# Patient Record
Sex: FEMALE | Race: BLACK OR AFRICAN AMERICAN | NOT HISPANIC OR LATINO | URBAN - METROPOLITAN AREA
[De-identification: names, ages, dates, MRNs, and addresses within clinical notes are randomized per-mention and may not be internally consistent; named-entity substitution may affect disease eponyms.]

---

## 2023-06-21 ENCOUNTER — APPOINTMENT (OUTPATIENT)
Dept: LAB | Facility: LAB | Age: 23
End: 2023-06-21
Payer: MEDICAID

## 2023-06-21 LAB
ABO GROUP (TYPE) IN BLOOD: NORMAL
ANTIBODY SCREEN: NORMAL
HIV 1/ 2 AG/AB SCREEN: NONREACTIVE
RH FACTOR: NORMAL
RUBELLA VIRUS IGG AB: POSITIVE
SYPHILIS TOTAL AB: NONREACTIVE
VARICELLA ZOSTER IGG: POSITIVE

## 2023-06-22 LAB
CHLAMYDIA TRACH., AMPLIFIED: NEGATIVE
HEPATITIS B VIRUS SURFACE AG PRESENCE IN SERUM: NONREACTIVE
HEPATITIS C VIRUS AB PRESENCE IN SERUM: NONREACTIVE
N. GONORRHEA, AMPLIFIED: NEGATIVE
URINE CULTURE: NORMAL

## 2023-06-23 LAB
HEMOGLOBIN A2: 3.1 %
HEMOGLOBIN A: 96.5 %
HEMOGLOBIN F: 0.4 %
HEMOGLOBIN IDENTIFICATION INTERPRETATION: NORMAL
PATH REVIEW-HGB IDENTIFICATION: NORMAL

## 2023-08-14 ENCOUNTER — HOSPITAL ENCOUNTER (OUTPATIENT)
Dept: DATA CONVERSION | Facility: HOSPITAL | Age: 23
End: 2023-08-14
Attending: OBSTETRICS & GYNECOLOGY
Payer: MEDICAID

## 2023-08-14 DIAGNOSIS — Z3A.25 25 WEEKS GESTATION OF PREGNANCY (HHS-HCC): ICD-10-CM

## 2023-08-14 DIAGNOSIS — O36.8120 DECREASED FETAL MOVEMENTS, SECOND TRIMESTER, NOT APPLICABLE OR UNSPECIFIED (HHS-HCC): ICD-10-CM

## 2023-08-14 DIAGNOSIS — N89.8 OTHER SPECIFIED NONINFLAMMATORY DISORDERS OF VAGINA: ICD-10-CM

## 2023-08-14 DIAGNOSIS — O09.32 SUPERVISION OF PREGNANCY WITH INSUFFICIENT ANTENATAL CARE, SECOND TRIMESTER (HHS-HCC): ICD-10-CM

## 2023-08-14 DIAGNOSIS — Z34.80 ENCOUNTER FOR SUPERVISION OF OTHER NORMAL PREGNANCY, UNSPECIFIED TRIMESTER (HHS-HCC): ICD-10-CM

## 2023-08-14 DIAGNOSIS — Z20.2 CONTACT WITH AND (SUSPECTED) EXPOSURE TO INFECTIONS WITH A PREDOMINANTLY SEXUAL MODE OF TRANSMISSION: ICD-10-CM

## 2023-08-14 DIAGNOSIS — R10.12 LEFT UPPER QUADRANT PAIN: ICD-10-CM

## 2023-08-14 DIAGNOSIS — O26.892 OTHER SPECIFIED PREGNANCY RELATED CONDITIONS, SECOND TRIMESTER (HHS-HCC): ICD-10-CM

## 2023-08-14 DIAGNOSIS — R35.0 FREQUENCY OF MICTURITION: ICD-10-CM

## 2023-08-14 LAB
CLUE CELLS WET PREP: NORMAL
TRICH WET PREP: NORMAL
TRICHOMONAS REFLEX COMMENT: NORMAL
WBC WET PREP: NORMAL /HPF
YEAST PRESENCE WET PREP: NORMAL

## 2023-08-15 LAB
CHLAMYDIA TRACH., AMPLIFIED: NEGATIVE
N. GONORRHEA, AMPLIFIED: NEGATIVE
TRICHOMONAS VAGINALIS: NEGATIVE
URINE CULTURE: NO GROWTH

## 2023-08-25 ENCOUNTER — LAB (OUTPATIENT)
Dept: LAB | Facility: LAB | Age: 23
End: 2023-08-25
Payer: MEDICAID

## 2023-08-25 LAB
ERYTHROCYTE DISTRIBUTION WIDTH (RATIO) BY AUTOMATED COUNT: 12.9 % (ref 11.5–14.5)
ERYTHROCYTE MEAN CORPUSCULAR HEMOGLOBIN CONCENTRATION (G/DL) BY AUTOMATED: 33.8 G/DL (ref 32–36)
ERYTHROCYTE MEAN CORPUSCULAR VOLUME (FL) BY AUTOMATED COUNT: 88 FL (ref 80–100)
ERYTHROCYTES (10*6/UL) IN BLOOD BY AUTOMATED COUNT: 4.31 X10E12/L (ref 4–5.2)
GLUCOSE, 1 HR SCREEN, PREG: 83 MG/DL
HEMATOCRIT (%) IN BLOOD BY AUTOMATED COUNT: 37.9 % (ref 36–46)
HEMOGLOBIN (G/DL) IN BLOOD: 12.8 G/DL (ref 12–16)
LEUKOCYTES (10*3/UL) IN BLOOD BY AUTOMATED COUNT: 6.2 X10E9/L (ref 4.4–11.3)
NRBC (PER 100 WBCS) BY AUTOMATED COUNT: 0 /100 WBC (ref 0–0)
PLATELETS (10*3/UL) IN BLOOD AUTOMATED COUNT: 273 X10E9/L (ref 150–450)
REFLEX ADDED, ANEMIA PANEL: NORMAL
SYPHILIS TOTAL AB: NONREACTIVE

## 2023-08-25 ASSESSMENT — EDINBURGH POSTNATAL DEPRESSION SCALE (EPDS)
I HAVE BEEN ABLE TO LAUGH AND SEE THE FUNNY SIDE OF THINGS: AS MUCH AS I ALWAYS COULD
I HAVE LOOKED FORWARD WITH ENJOYMENT TO THINGS: RATHER LESS THAN I USED TO
I HAVE BEEN ANXIOUS OR WORRIED FOR NO GOOD REASON: NO, NOT AT ALL
THINGS HAVE BEEN GETTING ON TOP OF ME: NO, I HAVE BEEN COPING AS WELL AS EVER
THE THOUGHT OF HARMING MYSELF HAS OCCURRED TO ME: NEVER
I HAVE FELT SAD OR MISERABLE: NO, NOT AT ALL
I HAVE FELT SCARED OR PANICKY FOR NO GOOD REASON: NO, NOT AT ALL
I HAVE BLAMED MYSELF UNNECESSARILY WHEN THINGS WENT WRONG: NO, NEVER
I HAVE BEEN SO UNHAPPY THAT I HAVE HAD DIFFICULTY SLEEPING: NOT AT ALL
I HAVE BEEN SO UNHAPPY THAT I HAVE BEEN CRYING: NO, NEVER
TOTAL SCORE: 1

## 2023-09-29 VITALS
WEIGHT: 166.45 LBS | SYSTOLIC BLOOD PRESSURE: 103 MMHG | DIASTOLIC BLOOD PRESSURE: 70 MMHG | RESPIRATION RATE: 20 BRPM | BODY MASS INDEX: 27.73 KG/M2 | HEIGHT: 65 IN | TEMPERATURE: 98.2 F | HEART RATE: 78 BPM | OXYGEN SATURATION: 100 %

## 2023-09-30 NOTE — PROGRESS NOTES
Current Stage:   Stage: Triage     OB Dating:   EDC/EGA:  ·  Final DIANA 2023   ·  EGA 25.1     Subjective Data:   Antepartum:  Vaginal Bleeding: Yes   Contractions/Abdominal Pain: Yes   Discharge/Loss of Fluid: No   Fetal Movement: Decreased   Fevers/Chills: No   Preeclampsia Symptoms: No   Antepartum:    24 yo  at 25.1 wga by LMP c/w 21.2 wk US, presents with decreased FM and abdominal pain.    Pt reports that last Tuesday, she walked a total of 7 hours. At the end of the day, she began having cramping and one episode of dark vaginal spotting that soaked through her underwear. The cramping and bleeding resolved that night, however, she has not  felt the baby move since then. She denies any fall or trauma to her abdomen. She also reports vaginal itching and desires STI testing today. She reports the upper left abdominal pain happens every time she eats so she has not been eating as much as a  result. She denies F/C/N/V/constipation. Last BM was this am. Additionally, she reports increased urination and thinks she may have a UTI.    Pregnancy notable for:  - One prenatal visit at 17 wks    OBHx:  2020: 40 wks, , 7#9oz  : SAB  : 38.6 wks, , 7#13oz    GYN hx:  denies h/o abnormal Pap or STIs  PMH: denies  PSH: one wisdom tooth removed  Fam hx: non-contributory  Meds: PNV  All: NKDA  Social hx: denies t/e/d          Objective Information:    Objective Information:      T   P  R  BP   MAP  SpO2   Value  36.1  76  18  102/60   74  100%  Date/Time  16:07  16:37  16:07  16:07   16:07  16:37  Range  (36.1C - 36.8C )  (73 - 78 )  (18 - 20 )  (102 - 103 )/ (60 - 70 )  (74 - 74 )  (99% - 100% )      Pain reported at  16:07: 0 = None      Physical Exam:   Constitutional: alert, oriented x3, conversational   Obstetric: 140, mod variability, +accels, -decels,  appropriate for gestational age  [place]: No contractions  SSE: mod milky white discharge, ectropion noted at  7oclock, no active bleeding from cervix, no blood in vault  SVE: externally 1 cm, internally closed/long/high   Eyes: Sclera white, EOM intact, no discharge or erythema   ENMT: No hearing deficit, no goiter present   Head/Neck: Normocephalic, atraumatic, full range  of motion intact   Respiratory/Thorax: normal respiratory effort   Gastrointestinal: soft, gravid, non-tender in all  quads   Genitourinary: No flank pain bilaterally   Musculoskeletal: Grossly WNL   Extremities: No edema, discoloration, or pain in  BLE   Neurological: Speech clear, no deficits noted   Psychological: Appropriate mood, behavior. Calm,  cooperative.   Skin: no rashes or lesions     Recent Lab Results:    Results:        I have reviewed these laboratory results:    Wet Prep. w/Reflex to Trich. by Amp Det.  14-Aug-2023 16:50:00      Result Value    Trichomonas  NONE SEEN    Clue Cell Identification  NONE SEEN    Yeast Cells  NONE SEEN    WBC, Wet Prep  1-2    Comments_  SEE COMMENT TRICHOMONAS WAS NOT SEEN BY WET PREP  REFLEXED TO TRICHOMONAS VAGINALIS BY  AMPLIFIED DETECTION.    Fluid Source  Genital vaginal          Testing:   NST Interpretation - Baby A:  ·  Baseline    ·  Variability moderate (amplitude range 6 to 25 bpm)   ·  Interpretation Appropriate for EGA (2 10x10 accels)   ·  Accelerations +   ·  Decelerations -     Assessment and Plan:        Additional Dx:   Limited prenatal care in second trimester: Entered Date:  14-Aug-2023 17:50   Vaginal itching: Entered Date: 14-Aug-2023 17:50   Abdominal pain during pregnancy in second trimester: Entered  Date: 14-Aug-2023 17:49   Decreased fetal movements in second trimester, single or unspecified fetus : Entered Date: 14-Aug-2023 17:49    Assessment:    22 yo  at 25.1 wga by LMP c/w 21.2 wk US, presents with decreased FM and abdominal pain.    Decreased FM  - no FM since last week  - FHR 140s, appropriate for gestational age    Abdominal Pain/VB  - no pain or  bleeding currently  - SSE: mod milky white discharge, ectropion noted at 7o'clock, no active bleeding from cervix, no blood in vault  - SVE: externally 1 cm, internally closed/long/high  - GC/CT/wet prep, urine cx sent, will notify if abnormal  - Rh positive, Rhogam not indicated  - no c/f PTL/abruption based on physical exam  - start Pepcid daily for suspected GERD based on description of meal-related discomfort in LUQ    Limited Prenatal Care  - one visit this pregnancy at 17 wks  - RN at [facility] 1200 tasked to set up an appt, now scheduled     Dispo: Home with precautions    Plan and tracing discussed with Dr. Tobar who reviewed tracing and agrees with d/c home    Elida Lucia, MSN, APRN, [facility]-BC      Plan of Care Reviewed With:  Plan of Care Reviewed With: patient     Attestation:   Note Completion:  I am a:  Advanced Practice Provider   Attending Only - Shared Visit with Advanced Practice Provider This is a shared visit.  I have reviewed the Advanced Practice Provider?s encounter note, approve the Advanced Practice Provider?s documentation,  and provide the following additional information from my personal encounter.    Comments/ Additional Findings    Wet prep wnl. Bleeding likely secondary to ectropion. Counseled patient on importance of establishing regular prenatal care.    Scarlet Tobar MD           Electronic Signatures:  Elida Lucia (APRN-CNP)  (Signed 14-Aug-2023 17:51)   Authored: Current Stage, OB Dating, Subjective Data,  Objective Data,  Testing, Assessment and Plan, Note Completion  Scarlet Tobar (MD (Fellow))  (Signed 14-Aug-2023 18:49)   Authored: Subjective Data, Objective Data, Assessment  and Plan, Note Completion   Co-Signer: Assessment and Plan, Note Completion      Last Updated: 14-Aug-2023 18:49 by Scarlet Tobar (MD (Fellow))

## 2023-11-07 PROBLEM — Z34.80 SUPERVISION OF OTHER NORMAL PREGNANCY, ANTEPARTUM (HHS-HCC): Status: ACTIVE | Noted: 2023-11-07

## 2023-11-07 NOTE — PROGRESS NOTES
Jareth Kamara is a 23 y.o.  at 37w5d with a working estimated date of delivery of 2023, by Last Menstrual Period who presents for a routine prenatal visit. Pt notes she not been seen since 26w because she was told that someone would give her a call to schedule a new appointment.     Pt notes  7/10 left upper quadrant pain when she does housework, states that it only occurs during that time and is sharp, lasting for about 30 seconds. She says that it feels like gas, denies rigid abdomen and vaginal bleeding. Notes that it does not feel like contractions.     Pt notes that she feels supported at home and has access to food. She states that really she's not eating a lot, just mainly drinking green tea.     She denies vaginal bleeding, leakage of fluid, decreased fetal movements, or contractions.    Objective   Physical Exam:   Weight: 75.8 kg (167 lb)  Expected Total Weight Gain: 7 kg (15 lb)-11.5 kg (25 lb)   Pregravid BMI: 27.46  BP: 118/73  Fetal Heart Rate: 154 Fundal Height (cm): 37 cm               Problem List Items Addressed This Visit       Supervision of other normal pregnancy, antepartum    Overview       male   added to BirthTracks   breast and bottlefeeding    Birth control: pt notes that she would like 6 week PP IUD (and plans to hide that from her partner and family members)          Poor weight gain of pregnancy    Overview       2lb weight gain so far during pregnancy  Fundal height measured wnl   Patient denies food insecurity ; she just isn't eating  Strongly encouraged patient to eat more, high protein foods          Limited prenatal care in third trimester    Overview     Inquired about pt's lapse in prenatal care, has not been since 26.5 wga  Pt notes she has transportation but previously was told someone would call her to schedule a subsequent appointment and did not receive a call. Informed pt that she will now be seen weekly, pt endorses understanding           RESOLVED: 37 weeks gestation of pregnancy - Primary    Relevant Orders    Group B Streptococcus (GBS) Prenatal Screen, Culture       Coping mechanisms and pain management options during labor discussed, Pt had NCBx2, prefers NCB this time  Postpartum contraception options discussed, Discussed copper IUD as a possible option and that strings can be cut short to make them more discrete. She desires nelda IUD, prefers to wait until 6 weeks as she does not want her mother or partner to know, and they will be present at birth    Discussed routine GBS screening, to be completed today  Discussed expectant management vs. scheduling term IOL, prefers to await spontaneous labor   Reviewed s/sx of labor, warning signs, fetal movement counts, and when to call provider  Follow up in 1 week for a routine prenatal visit.  next visit: Discuss GBS results, support, circ, pedi?    ALBERTINA Cosby APRN-CNM

## 2023-11-10 ENCOUNTER — ROUTINE PRENATAL (OUTPATIENT)
Dept: OBSTETRICS AND GYNECOLOGY | Facility: HOSPITAL | Age: 23
End: 2023-11-10
Payer: MEDICAID

## 2023-11-10 VITALS — BODY MASS INDEX: 27.79 KG/M2 | WEIGHT: 167 LBS | SYSTOLIC BLOOD PRESSURE: 118 MMHG | DIASTOLIC BLOOD PRESSURE: 73 MMHG

## 2023-11-10 DIAGNOSIS — O09.33 LIMITED PRENATAL CARE IN THIRD TRIMESTER (HHS-HCC): ICD-10-CM

## 2023-11-10 DIAGNOSIS — Z3A.37 37 WEEKS GESTATION OF PREGNANCY (HHS-HCC): Primary | ICD-10-CM

## 2023-11-10 DIAGNOSIS — O26.13 LOW WEIGHT GAIN DURING PREGNANCY IN THIRD TRIMESTER (HHS-HCC): ICD-10-CM

## 2023-11-10 DIAGNOSIS — Z34.80 SUPERVISION OF OTHER NORMAL PREGNANCY, ANTEPARTUM (HHS-HCC): ICD-10-CM

## 2023-11-10 PROBLEM — O26.10 POOR WEIGHT GAIN OF PREGNANCY (HHS-HCC): Status: ACTIVE | Noted: 2023-11-10

## 2023-11-10 PROCEDURE — 87081 CULTURE SCREEN ONLY: CPT | Performed by: ADVANCED PRACTICE MIDWIFE

## 2023-11-10 PROCEDURE — 99213 OFFICE O/P EST LOW 20 MIN: CPT | Mod: TH | Performed by: ADVANCED PRACTICE MIDWIFE

## 2023-11-10 PROCEDURE — 99213 OFFICE O/P EST LOW 20 MIN: CPT | Performed by: ADVANCED PRACTICE MIDWIFE

## 2023-11-10 RX ORDER — IBUPROFEN 600 MG/1
1 TABLET ORAL EVERY 6 HOURS
COMMUNITY
Start: 2022-07-06 | End: 2023-11-10 | Stop reason: ALTCHOICE

## 2023-11-10 RX ORDER — ACETAMINOPHEN 500 MG
2 TABLET ORAL EVERY 8 HOURS PRN
COMMUNITY
Start: 2019-01-17 | End: 2023-11-10 | Stop reason: ALTCHOICE

## 2023-11-10 RX ORDER — PNV NO.95/FERROUS FUM/FOLIC AC 28MG-0.8MG
1 TABLET ORAL DAILY
COMMUNITY
Start: 2022-02-17 | End: 2024-06-05 | Stop reason: ALTCHOICE

## 2023-11-10 RX ORDER — DOCUSATE SODIUM 100 MG/1
1 CAPSULE, LIQUID FILLED ORAL 2 TIMES DAILY PRN
COMMUNITY
Start: 2022-07-06 | End: 2023-11-10 | Stop reason: ALTCHOICE

## 2023-11-10 RX ORDER — NORETHINDRONE 0.35 MG/1
1 TABLET ORAL DAILY
COMMUNITY
Start: 2022-07-06 | End: 2023-11-10 | Stop reason: ALTCHOICE

## 2023-11-10 ASSESSMENT — ENCOUNTER SYMPTOMS
DEPRESSION: 0
LOSS OF SENSATION IN FEET: 0
OCCASIONAL FEELINGS OF UNSTEADINESS: 0

## 2023-11-12 LAB — GP B STREP GENITAL QL CULT: NORMAL

## 2023-11-16 ENCOUNTER — HOSPITAL ENCOUNTER (EMERGENCY)
Facility: HOSPITAL | Age: 23
Discharge: HOME | End: 2023-11-16
Payer: MEDICAID

## 2023-11-16 VITALS
HEART RATE: 78 BPM | DIASTOLIC BLOOD PRESSURE: 7 MMHG | SYSTOLIC BLOOD PRESSURE: 122 MMHG | OXYGEN SATURATION: 95 % | TEMPERATURE: 97.3 F | RESPIRATION RATE: 20 BRPM

## 2023-11-16 DIAGNOSIS — O26.13 LOW WEIGHT GAIN DURING PREGNANCY IN THIRD TRIMESTER (HHS-HCC): ICD-10-CM

## 2023-11-16 DIAGNOSIS — O09.33 LIMITED PRENATAL CARE IN THIRD TRIMESTER (HHS-HCC): ICD-10-CM

## 2023-11-16 DIAGNOSIS — Z34.80 SUPERVISION OF OTHER NORMAL PREGNANCY, ANTEPARTUM (HHS-HCC): Primary | ICD-10-CM

## 2023-11-16 PROCEDURE — 99283 EMERGENCY DEPT VISIT LOW MDM: CPT | Performed by: EMERGENCY MEDICINE

## 2023-11-16 PROCEDURE — 99285 EMERGENCY DEPT VISIT HI MDM: CPT | Mod: 25

## 2023-11-16 PROCEDURE — 59409 OBSTETRICAL CARE: CPT | Performed by: STUDENT IN AN ORGANIZED HEALTH CARE EDUCATION/TRAINING PROGRAM

## 2023-11-16 RX ADMIN — OXYTOCIN-SODIUM CHLORIDE 0.9% IV SOLN 30 UNIT/500ML 60 MILLI-UNITS/MIN: 30-0.9/5 SOLUTION at 23:30

## 2023-11-16 ASSESSMENT — PAIN SCALES - GENERAL: PAINLEVEL_OUTOF10: 3

## 2023-11-16 ASSESSMENT — COLUMBIA-SUICIDE SEVERITY RATING SCALE - C-SSRS
6. HAVE YOU EVER DONE ANYTHING, STARTED TO DO ANYTHING, OR PREPARED TO DO ANYTHING TO END YOUR LIFE?: NO
2. HAVE YOU ACTUALLY HAD ANY THOUGHTS OF KILLING YOURSELF?: NO
1. IN THE PAST MONTH, HAVE YOU WISHED YOU WERE DEAD OR WISHED YOU COULD GO TO SLEEP AND NOT WAKE UP?: NO

## 2023-11-16 ASSESSMENT — PAIN DESCRIPTION - DESCRIPTORS: DESCRIPTORS: CRAMPING

## 2023-11-17 ENCOUNTER — HOSPITAL ENCOUNTER (INPATIENT)
Facility: HOSPITAL | Age: 23
LOS: 1 days | Discharge: HOME | End: 2023-11-18
Attending: OBSTETRICS & GYNECOLOGY | Admitting: OBSTETRICS & GYNECOLOGY
Payer: MEDICAID

## 2023-11-17 ENCOUNTER — APPOINTMENT (OUTPATIENT)
Dept: OBSTETRICS AND GYNECOLOGY | Facility: HOSPITAL | Age: 23
End: 2023-11-17
Payer: MEDICAID

## 2023-11-17 LAB
ABO GROUP (TYPE) IN BLOOD: NORMAL
ALBUMIN SERPL BCP-MCNC: 3.2 G/DL (ref 3.4–5)
ALP SERPL-CCNC: 151 U/L (ref 33–110)
ALT SERPL W P-5'-P-CCNC: 13 U/L (ref 7–45)
ANION GAP SERPL CALC-SCNC: 15 MMOL/L (ref 10–20)
ANTIBODY SCREEN: NORMAL
AST SERPL W P-5'-P-CCNC: 18 U/L (ref 9–39)
BILIRUB SERPL-MCNC: 0.3 MG/DL (ref 0–1.2)
BUN SERPL-MCNC: 10 MG/DL (ref 6–23)
CALCIUM SERPL-MCNC: 8.3 MG/DL (ref 8.6–10.6)
CHLORIDE SERPL-SCNC: 106 MMOL/L (ref 98–107)
CO2 SERPL-SCNC: 18 MMOL/L (ref 21–32)
CREAT SERPL-MCNC: 0.56 MG/DL (ref 0.5–1.05)
ERYTHROCYTE [DISTWIDTH] IN BLOOD BY AUTOMATED COUNT: 12.9 % (ref 11.5–14.5)
GFR SERPL CREATININE-BSD FRML MDRD: >90 ML/MIN/1.73M*2
GLUCOSE SERPL-MCNC: 87 MG/DL (ref 74–99)
HCT VFR BLD AUTO: 36.7 % (ref 36–46)
HGB BLD-MCNC: 12.1 G/DL (ref 12–16)
MCH RBC QN AUTO: 28.8 PG (ref 26–34)
MCHC RBC AUTO-ENTMCNC: 33 G/DL (ref 32–36)
MCV RBC AUTO: 87 FL (ref 80–100)
NRBC BLD-RTO: 0 /100 WBCS (ref 0–0)
PLATELET # BLD AUTO: 212 X10*3/UL (ref 150–450)
POTASSIUM SERPL-SCNC: 4.2 MMOL/L (ref 3.5–5.3)
PROT SERPL-MCNC: 5.9 G/DL (ref 6.4–8.2)
RBC # BLD AUTO: 4.2 X10*6/UL (ref 4–5.2)
RH FACTOR (ANTIGEN D): NORMAL
SODIUM SERPL-SCNC: 135 MMOL/L (ref 136–145)
T PALLIDUM AB SER QL: NONREACTIVE
WBC # BLD AUTO: 8.8 X10*3/UL (ref 4.4–11.3)

## 2023-11-17 PROCEDURE — 99223 1ST HOSP IP/OBS HIGH 75: CPT | Performed by: STUDENT IN AN ORGANIZED HEALTH CARE EDUCATION/TRAINING PROGRAM

## 2023-11-17 PROCEDURE — 86901 BLOOD TYPING SEROLOGIC RH(D): CPT | Performed by: STUDENT IN AN ORGANIZED HEALTH CARE EDUCATION/TRAINING PROGRAM

## 2023-11-17 PROCEDURE — 86780 TREPONEMA PALLIDUM: CPT | Performed by: STUDENT IN AN ORGANIZED HEALTH CARE EDUCATION/TRAINING PROGRAM

## 2023-11-17 PROCEDURE — 2500000001 HC RX 250 WO HCPCS SELF ADMINISTERED DRUGS (ALT 637 FOR MEDICARE OP): Performed by: STUDENT IN AN ORGANIZED HEALTH CARE EDUCATION/TRAINING PROGRAM

## 2023-11-17 PROCEDURE — 2500000004 HC RX 250 GENERAL PHARMACY W/ HCPCS (ALT 636 FOR OP/ED): Performed by: STUDENT IN AN ORGANIZED HEALTH CARE EDUCATION/TRAINING PROGRAM

## 2023-11-17 PROCEDURE — 85027 COMPLETE CBC AUTOMATED: CPT | Performed by: STUDENT IN AN ORGANIZED HEALTH CARE EDUCATION/TRAINING PROGRAM

## 2023-11-17 PROCEDURE — 7100000016 HC LABOR RECOVERY PER HOUR

## 2023-11-17 PROCEDURE — 80053 COMPREHEN METABOLIC PANEL: CPT | Performed by: STUDENT IN AN ORGANIZED HEALTH CARE EDUCATION/TRAINING PROGRAM

## 2023-11-17 PROCEDURE — 36415 COLL VENOUS BLD VENIPUNCTURE: CPT | Performed by: STUDENT IN AN ORGANIZED HEALTH CARE EDUCATION/TRAINING PROGRAM

## 2023-11-17 PROCEDURE — 1100000001 HC PRIVATE ROOM DAILY

## 2023-11-17 RX ORDER — OXYTOCIN 10 [USP'U]/ML
10 INJECTION, SOLUTION INTRAMUSCULAR; INTRAVENOUS ONCE AS NEEDED
Status: DISCONTINUED | OUTPATIENT
Start: 2023-11-17 | End: 2023-11-18 | Stop reason: HOSPADM

## 2023-11-17 RX ORDER — TRANEXAMIC ACID 100 MG/ML
1000 INJECTION, SOLUTION INTRAVENOUS ONCE AS NEEDED
Status: DISCONTINUED | OUTPATIENT
Start: 2023-11-17 | End: 2023-11-18 | Stop reason: HOSPADM

## 2023-11-17 RX ORDER — LIDOCAINE 560 MG/1
1 PATCH PERCUTANEOUS; TOPICAL; TRANSDERMAL
Status: DISCONTINUED | OUTPATIENT
Start: 2023-11-17 | End: 2023-11-18 | Stop reason: HOSPADM

## 2023-11-17 RX ORDER — DIPHENHYDRAMINE HCL 25 MG
25 CAPSULE ORAL EVERY 6 HOURS PRN
Status: DISCONTINUED | OUTPATIENT
Start: 2023-11-17 | End: 2023-11-18 | Stop reason: HOSPADM

## 2023-11-17 RX ORDER — KETOROLAC TROMETHAMINE 30 MG/ML
30 INJECTION, SOLUTION INTRAMUSCULAR; INTRAVENOUS ONCE
Status: COMPLETED | OUTPATIENT
Start: 2023-11-17 | End: 2023-11-17

## 2023-11-17 RX ORDER — SIMETHICONE 80 MG
80 TABLET,CHEWABLE ORAL 4 TIMES DAILY PRN
Status: DISCONTINUED | OUTPATIENT
Start: 2023-11-17 | End: 2023-11-18 | Stop reason: HOSPADM

## 2023-11-17 RX ORDER — ONDANSETRON HYDROCHLORIDE 2 MG/ML
4 INJECTION, SOLUTION INTRAVENOUS EVERY 6 HOURS PRN
Status: DISCONTINUED | OUTPATIENT
Start: 2023-11-17 | End: 2023-11-18 | Stop reason: HOSPADM

## 2023-11-17 RX ORDER — ONDANSETRON 4 MG/1
4 TABLET, FILM COATED ORAL EVERY 6 HOURS PRN
Status: DISCONTINUED | OUTPATIENT
Start: 2023-11-17 | End: 2023-11-18 | Stop reason: HOSPADM

## 2023-11-17 RX ORDER — HYDRALAZINE HYDROCHLORIDE 20 MG/ML
5 INJECTION INTRAMUSCULAR; INTRAVENOUS ONCE AS NEEDED
Status: DISCONTINUED | OUTPATIENT
Start: 2023-11-17 | End: 2023-11-18 | Stop reason: HOSPADM

## 2023-11-17 RX ORDER — DIPHENHYDRAMINE HYDROCHLORIDE 50 MG/ML
25 INJECTION INTRAMUSCULAR; INTRAVENOUS EVERY 6 HOURS PRN
Status: DISCONTINUED | OUTPATIENT
Start: 2023-11-17 | End: 2023-11-18 | Stop reason: HOSPADM

## 2023-11-17 RX ORDER — LABETALOL HYDROCHLORIDE 5 MG/ML
20 INJECTION, SOLUTION INTRAVENOUS ONCE AS NEEDED
Status: DISCONTINUED | OUTPATIENT
Start: 2023-11-17 | End: 2023-11-18 | Stop reason: HOSPADM

## 2023-11-17 RX ORDER — NIFEDIPINE 10 MG/1
10 CAPSULE ORAL ONCE AS NEEDED
Status: DISCONTINUED | OUTPATIENT
Start: 2023-11-17 | End: 2023-11-18 | Stop reason: HOSPADM

## 2023-11-17 RX ORDER — POLYETHYLENE GLYCOL 3350 17 G/17G
17 POWDER, FOR SOLUTION ORAL 2 TIMES DAILY PRN
Status: DISCONTINUED | OUTPATIENT
Start: 2023-11-17 | End: 2023-11-18 | Stop reason: HOSPADM

## 2023-11-17 RX ORDER — METHYLERGONOVINE MALEATE 0.2 MG/ML
0.2 INJECTION INTRAVENOUS ONCE AS NEEDED
Status: DISCONTINUED | OUTPATIENT
Start: 2023-11-17 | End: 2023-11-18 | Stop reason: HOSPADM

## 2023-11-17 RX ORDER — ADHESIVE BANDAGE
10 BANDAGE TOPICAL
Status: DISCONTINUED | OUTPATIENT
Start: 2023-11-17 | End: 2023-11-18 | Stop reason: HOSPADM

## 2023-11-17 RX ORDER — OXYTOCIN/0.9 % SODIUM CHLORIDE 30/500 ML
60 PLASTIC BAG, INJECTION (ML) INTRAVENOUS ONCE AS NEEDED
Status: COMPLETED | OUTPATIENT
Start: 2023-11-17 | End: 2023-11-16

## 2023-11-17 RX ORDER — BISACODYL 10 MG/1
10 SUPPOSITORY RECTAL DAILY PRN
Status: DISCONTINUED | OUTPATIENT
Start: 2023-11-17 | End: 2023-11-18 | Stop reason: HOSPADM

## 2023-11-17 RX ORDER — ACETAMINOPHEN 325 MG/1
975 TABLET ORAL EVERY 6 HOURS
Status: DISCONTINUED | OUTPATIENT
Start: 2023-11-17 | End: 2023-11-18 | Stop reason: HOSPADM

## 2023-11-17 RX ORDER — LOPERAMIDE HYDROCHLORIDE 2 MG/1
4 CAPSULE ORAL EVERY 2 HOUR PRN
Status: DISCONTINUED | OUTPATIENT
Start: 2023-11-17 | End: 2023-11-18 | Stop reason: HOSPADM

## 2023-11-17 RX ORDER — CARBOPROST TROMETHAMINE 250 UG/ML
250 INJECTION, SOLUTION INTRAMUSCULAR ONCE AS NEEDED
Status: DISCONTINUED | OUTPATIENT
Start: 2023-11-17 | End: 2023-11-18 | Stop reason: HOSPADM

## 2023-11-17 RX ORDER — MISOPROSTOL 200 UG/1
800 TABLET ORAL ONCE AS NEEDED
Status: DISCONTINUED | OUTPATIENT
Start: 2023-11-17 | End: 2023-11-18 | Stop reason: HOSPADM

## 2023-11-17 RX ORDER — IBUPROFEN 600 MG/1
600 TABLET ORAL EVERY 6 HOURS
Status: DISCONTINUED | OUTPATIENT
Start: 2023-11-17 | End: 2023-11-18 | Stop reason: HOSPADM

## 2023-11-17 RX ORDER — ENOXAPARIN SODIUM 100 MG/ML
40 INJECTION SUBCUTANEOUS EVERY 24 HOURS
Status: DISCONTINUED | OUTPATIENT
Start: 2023-11-17 | End: 2023-11-18 | Stop reason: HOSPADM

## 2023-11-17 RX ADMIN — ACETAMINOPHEN 975 MG: 325 TABLET ORAL at 06:33

## 2023-11-17 RX ADMIN — IBUPROFEN 600 MG: 600 TABLET, FILM COATED ORAL at 12:33

## 2023-11-17 RX ADMIN — IBUPROFEN 600 MG: 600 TABLET, FILM COATED ORAL at 18:36

## 2023-11-17 RX ADMIN — KETOROLAC TROMETHAMINE 30 MG: 30 INJECTION, SOLUTION INTRAMUSCULAR; INTRAVENOUS at 00:45

## 2023-11-17 RX ADMIN — ACETAMINOPHEN 975 MG: 325 TABLET ORAL at 18:36

## 2023-11-17 RX ADMIN — IBUPROFEN 600 MG: 600 TABLET, FILM COATED ORAL at 23:57

## 2023-11-17 RX ADMIN — ACETAMINOPHEN 975 MG: 325 TABLET ORAL at 23:57

## 2023-11-17 RX ADMIN — ACETAMINOPHEN 975 MG: 325 TABLET ORAL at 00:45

## 2023-11-17 RX ADMIN — ACETAMINOPHEN 975 MG: 325 TABLET ORAL at 12:33

## 2023-11-17 RX ADMIN — IBUPROFEN 600 MG: 600 TABLET, FILM COATED ORAL at 06:33

## 2023-11-17 SDOH — SOCIAL STABILITY: SOCIAL INSECURITY: ABUSE SCREEN: ADULT

## 2023-11-17 SDOH — ECONOMIC STABILITY: HOUSING INSECURITY: DO YOU FEEL UNSAFE GOING BACK TO THE PLACE WHERE YOU ARE LIVING?: NO

## 2023-11-17 SDOH — SOCIAL STABILITY: SOCIAL INSECURITY: ARE THERE ANY APPARENT SIGNS OF INJURIES/BEHAVIORS THAT COULD BE RELATED TO ABUSE/NEGLECT?: NO

## 2023-11-17 SDOH — HEALTH STABILITY: MENTAL HEALTH: WERE YOU ABLE TO COMPLETE ALL THE BEHAVIORAL HEALTH SCREENINGS?: YES

## 2023-11-17 SDOH — HEALTH STABILITY: MENTAL HEALTH: SUICIDAL BEHAVIOR (LIFETIME): NO

## 2023-11-17 SDOH — SOCIAL STABILITY: SOCIAL INSECURITY: DOES ANYONE TRY TO KEEP YOU FROM HAVING/CONTACTING OTHER FRIENDS OR DOING THINGS OUTSIDE YOUR HOME?: NO

## 2023-11-17 SDOH — HEALTH STABILITY: MENTAL HEALTH: HAVE YOU USED ANY PRESCRIPTION DRUGS OTHER THAN PRESCRIBED IN THE PAST 12 MONTHS?: NO

## 2023-11-17 SDOH — SOCIAL STABILITY: SOCIAL INSECURITY: PHYSICAL ABUSE: DENIES

## 2023-11-17 SDOH — SOCIAL STABILITY: SOCIAL INSECURITY: DO YOU FEEL ANYONE HAS EXPLOITED OR TAKEN ADVANTAGE OF YOU FINANCIALLY OR OF YOUR PERSONAL PROPERTY?: NO

## 2023-11-17 SDOH — SOCIAL STABILITY: SOCIAL INSECURITY: HAVE YOU HAD THOUGHTS OF HARMING ANYONE ELSE?: NO

## 2023-11-17 SDOH — HEALTH STABILITY: MENTAL HEALTH: HAVE YOU USED ANY SUBSTANCES (CANABIS, COCAINE, HEROIN, HALLUCINOGENS, INHALANTS, ETC.) IN THE PAST 12 MONTHS?: NO

## 2023-11-17 SDOH — HEALTH STABILITY: MENTAL HEALTH: WISH TO BE DEAD (PAST 1 MONTH): NO

## 2023-11-17 SDOH — SOCIAL STABILITY: SOCIAL INSECURITY: VERBAL ABUSE: DENIES

## 2023-11-17 SDOH — SOCIAL STABILITY: SOCIAL INSECURITY: HAS ANYONE EVER THREATENED TO HURT YOUR FAMILY OR YOUR PETS?: NO

## 2023-11-17 SDOH — SOCIAL STABILITY: SOCIAL INSECURITY: ARE YOU OR HAVE YOU BEEN THREATENED OR ABUSED PHYSICALLY, EMOTIONALLY, OR SEXUALLY BY ANYONE?: NO

## 2023-11-17 SDOH — HEALTH STABILITY: MENTAL HEALTH: NON-SPECIFIC ACTIVE SUICIDAL THOUGHTS (PAST 1 MONTH): NO

## 2023-11-17 ASSESSMENT — PATIENT HEALTH QUESTIONNAIRE - PHQ9
SUM OF ALL RESPONSES TO PHQ9 QUESTIONS 1 & 2: 0
2. FEELING DOWN, DEPRESSED OR HOPELESS: NOT AT ALL
1. LITTLE INTEREST OR PLEASURE IN DOING THINGS: NOT AT ALL

## 2023-11-17 ASSESSMENT — PAIN SCALES - GENERAL
PAINLEVEL_OUTOF10: 0 - NO PAIN
PAINLEVEL_OUTOF10: 1
PAINLEVEL_OUTOF10: 0 - NO PAIN

## 2023-11-17 ASSESSMENT — PAIN DESCRIPTION - DESCRIPTORS: DESCRIPTORS: CRAMPING

## 2023-11-17 NOTE — ED TRIAGE NOTES
39 weeks DIANA next Thursday , delivered en route to ED to make patient . Mom a+x4, breaths equal and unlabored. Placenta delivered en route as well.

## 2023-11-17 NOTE — H&P
Obstetrical Admission History and Physical      Chief Complaint: No chief complaint on file.    Assessment/Plan    Yadira Kamara is a 23 y.o.  PPD#0 from  in the field at 38.5wga.     PPD#0   - Delivery in the field  - EBL approximately 300cc, minimal by time of arrival. No vaginal lacerations.   - Pain control with toradol -> scheduled tylenol/ibuprofen   - Admission T&S, CBC collected   - Continue routine postoperative care     Feeding - breast and bottle  - Continue to encourage breast feeding  - Lactation consult as needed    1 mild range BP   - HELLP labs ordered     Postpartum Birth control  - Plans for interval IUD       DVT prophylaxis  - SCDs  - Ambulation  - Lovenox ppx      Dispo: anticipate discharge on PPD#2 if continues to meet milestones. Plan for follow up in 1 wk for BP check and in 4-6 weeks for PP visit.    d/w Dr. Murphy Charles MD PGY-4      Principal Problem:    Labor and delivery indication for care or intervention      Pregnancy Problems (from 23 to present)       Problem Noted Resolved    Labor and delivery indication for care or intervention 2023 by Jany Charles MD No    Priority:  Medium      Poor weight gain of pregnancy 11/10/2023 by Aga Arzola No    Priority:  Medium      Overview Addendum 11/10/2023  4:38 PM by ALBERTINA Morris       2lb weight gain so far during pregnancy  Fundal height measured wnl   Patient denies food insecurity ; she just isn't eating  Strongly encouraged patient to eat more, high protein foods          Limited prenatal care in third trimester 11/10/2023 by ALBERTINA Cosby No    Priority:  Medium      Overview Addendum 11/10/2023  4:28 PM by ALBERTINA Cosby     Inquired about pt's lapse in prenatal care, has not been since 26.5 wga  Pt notes she has transportation but previously was told someone would call her to schedule a subsequent appointment and did not receive a call. Informed pt  that she will now be seen weekly, pt endorses understanding          Supervision of other normal pregnancy, antepartum 2023 by ALBERTINA Morris No    Priority:  Medium      Overview Addendum 11/10/2023  4:39 PM by ALBERTINA Morris       male   added to BirthTracks   breast and bottlefeeding    NCB  Birth control: pt notes that she would like 6 week PP IUD (and plans to hide that from her partner and family members)          37 weeks gestation of pregnancy 11/10/2023 by ALBERTINA Cosby 11/10/2023 by ALBERTINA Morris          Subjective   Yadira Kamara is a 23 y.o.  PPD#0 from  in the field at 38.5wga.     Code OB called for delivery in the field. Patient arrived in stable condition. Reports she started feeling contractions around 2pm today. Called EMS and delivered in ambulance tonight. SROM immediately prior to delivery. Placenta delivered in ambulance shortly after delivery. Having some cramping now, otherwise feeling well.     Pregnancy c/b:   - poor weight gain, denies food insecurity   - limited pnc in 3rd trimester      Obstetrical History   OB History    Para Term  AB Living   4 2 2 0 1 2   SAB IAB Ectopic Multiple Live Births   1 0 0 0 2      # Outcome Date GA Lbr Holger/2nd Weight Sex Delivery Anes PTL Lv   4 Current            3 Term 22 38w6d  3544 g M Vag-Spont None N BARB   2 SAB 2021           1 Term 10/26/20 40w0d  3430 g M Vag-Spont None N BARB       Past Medical History  History reviewed. No pertinent past medical history.     Past Surgical History   History reviewed. No pertinent surgical history.    Social History  Social History     Tobacco Use    Smoking status: Never    Smokeless tobacco: Never   Substance Use Topics    Alcohol use: Not Currently     Substance and Sexual Activity   Drug Use Never       Allergies  Patient has no known allergies.     Medications  Medications Prior to Admission   Medication  "Sig Dispense Refill Last Dose    prenatal vitamin, iron-folic, (Prenatal) 27 mg iron-800 mcg folic acid tablet Take 1 tablet by mouth once daily.          Objective    Last Vitals  Temp Pulse Resp BP MAP O2 Sat     74   124/85   100 %     Physical Examination  GENERAL: Examination reveals a well developed, well nourished, gravid female in no acute distress. She is alert and cooperative.  HEENT: PERRLA. External ears normal. Nose normal, no erythema or discharge. Mouth and throat clear.  NECK: supple, no significant adenopathy  LUNGS: clear to auscultation bilaterally  HEART: regular rate and rhythm, S1, S2 normal, no murmur, click, rub or gallop  ABDOMEN: fundus firm <u  VAGINA: no vaginal lacerations, no active bleeding   EXTREMITIES: no redness or tenderness in the calves or thighs, no edema  SKIN: normal coloration and turgor, no rashes  NEUROLOGICAL: alert, oriented, normal speech, no focal findings or movement disorder noted  PSYCHOLOGICAL: awake and alert; oriented to person, place, and time    Lab Review  No results found for: \"WBC\", \"HGB\", \"HCT\", \"PLT\"    "

## 2023-11-17 NOTE — PROGRESS NOTES
Postpartum Progress Note    Assessment/Plan   Yadira Kamara is a 23 y.o., , who delivered at 38w4d gestation.    Now PPD#1 s/p Vaginal, Spontaneous  on 2023   - Continue routine postpartum care  - Pain well controlled on po medications  - DVT risk score DVT Score: 3   - Hgb:   Results from last 7 days   Lab Units 23  0047   HEMOGLOBIN g/dL 12.1      Maternal Well-Being  - Vitals stable  - All questions and concerns address  - One mild range BP on , does not meet criteria for HDP at this time, asymptomatic     Feeding  - Breastfeeding/pumping encouraged  - Lactation consult prn    Contraception  - Defers contraception to primary OB/PP visit. We discussed pregnancy spacing of at least one year, abstaining from intercourse for 6wks, and the ability to become pregnant in the absence of regular menses. Pt verbalized understanding.  - Encouraged to continue PNV    Dispo  - Anticipate d/c on PPD #2 if BP wnl and meeting all postpartum milestones  - Follow up in 4-6 wk for postpartum visit with your OB provider.     YARY Hernandes-CNP     Principal Problem:    Labor and delivery indication for care or intervention    Pregnancy Problems (from 23 to present)       Problem Noted Resolved    Labor and delivery indication for care or intervention 2023 by Jany Charles MD No    Priority:  Medium      Poor weight gain of pregnancy 11/10/2023 by Aga Arzola No    Priority:  Medium      Overview Addendum 11/10/2023  4:38 PM by ALBERTINA Morris       2lb weight gain so far during pregnancy  Fundal height measured wnl   Patient denies food insecurity ; she just isn't eating  Strongly encouraged patient to eat more, high protein foods          Limited prenatal care in third trimester 11/10/2023 by ALBERTINA Cosby No    Priority:  Medium      Overview Addendum 11/10/2023  4:28 PM by ALBERTINA Cosby     Inquired about pt's lapse in prenatal care,  has not been since 26.5 wga  Pt notes she has transportation but previously was told someone would call her to schedule a subsequent appointment and did not receive a call. Informed pt that she will now be seen weekly, pt endorses understanding          Supervision of other normal pregnancy, antepartum 11/7/2023 by ALBERTINA Morris No    Priority:  Medium      Overview Addendum 11/10/2023  4:39 PM by ALBERTINA Morris     male   added to BirthTracks   breast and bottlefeeding    NCB  Birth control: pt notes that she would like 6 week PP IUD (and plans to hide that from her partner and family members)          37 weeks gestation of pregnancy 11/10/2023 by ALBERTINA Cosby 11/10/2023 by ALBERTINA Morris        Hospital course: no complications  Vaginal Birth  Patient is currently breastfeedingThe patient's blood type is O POS. The baby's blood type is A POS . Rhogam is not indicated.    Subjective   Pt seen @ bedside in NAD. Denies CP, SOB, calf pain, fever, passage of large clots. Voiding spontaneously,  ambulating independently, pain well controlled on PO meds, and tolerating PO intake.  Denies HA, N/V, RUQ pain, vision changes, chest pain, or SOB.  Denies passing flatus yet. Encouraged ambulation, fiber foods, increased water intake.    Objective   Allergies:   Patient has no known allergies.         Last Vitals:  Temp Pulse Resp BP MAP Pulse Ox   36 °C (96.8 °F) 72 16 120/79   94 %     Vitals Min/Max Last 24 Hours:  Temp  Min: 36 °C (96.8 °F)  Max: 36.7 °C (98.1 °F)  Pulse  Min: 71  Max: 96  Resp  Min: 16  Max: 20  BP  Min: 111/69  Max: 130/70    Intake/Output:     Intake/Output Summary (Last 24 hours) at 11/17/2023 1037  Last data filed at 11/16/2023 2330  Gross per 24 hour   Intake --   Output 300 ml   Net -300 ml     Physical Exam:  General: examination reveals a well developed, well nourished, female, in no acute distress. She is alert and  cooperative.  HEENT: external ears normal. Nose normal, no erythema or discharge.  Neck: supple, no significant adenopathy  Lungs: breathing even and unlabored, lungs clear all fields  Cardiac: warm and well perfused, heart rate regular, RRR, no murmur  Fundus: firm and below umbilicus, lochia light  Abdominal: soft, appropriately tender, non-distended, bowel sounds hypoactive, no flatus yet  Extremities: no redness or tenderness in the calves or thighs, no edema.  Neurological: alert, oriented, normal speech, no focal findings or movement disorder noted.  Psychological: awake and alert; oriented to person, place, and time.  Skin: no rashes or lesions    Lab Data:  Lab Results   Component Value Date    ABO O 11/17/2023    LABRH POS 11/17/2023    ABSCRN NEG 11/17/2023     Lab Results   Component Value Date    WBC 8.8 11/17/2023    HGB 12.1 11/17/2023    HCT 36.7 11/17/2023     11/17/2023     Lab Results   Component Value Date    GLUCOSE 87 11/17/2023     (L) 11/17/2023    K 4.2 11/17/2023     11/17/2023    CO2 18 (L) 11/17/2023    ANIONGAP 15 11/17/2023    BUN 10 11/17/2023    CREATININE 0.56 11/17/2023    EGFR >90 11/17/2023    CALCIUM 8.3 (L) 11/17/2023    ALBUMIN 3.2 (L) 11/17/2023    PROT 5.9 (L) 11/17/2023    ALKPHOS 151 (H) 11/17/2023    ALT 13 11/17/2023    AST 18 11/17/2023    BILITOT 0.3 11/17/2023

## 2023-11-17 NOTE — LACTATION NOTE
Lactation Consultant Note  Lactation Consultation  Reason for Consult: Initial assessment  Consultant Name: Anastasia Rizvi    Maternal Information  Has mother  before?: Yes  How long did the mother previously breastfeed?: Several months each for her now 1 year old and 4 year old  Previous Maternal Breastfeeding Challenges: None  Infant to breast within first 2 hours of birth?: Yes  Exclusive Pump and Bottle Feed: No    Infant Assessment  Infant Behavior: Deep sleep    Feeding Assessment  Nutrition Source: Breastmilk  Feeding Method: Nursing at the breast    Patient Follow-up  Inpatient Lactation Follow-up Needed : Yes  Outpatient Lactation Follow-up: Recommended    Recommendations/Summary  Checked in with this mom who is an experienced breastfeeder. She has a now 4 year old and 1 year old, both of whom she successfully  for several months without complication. Mom states that this baby has been latching well to the breast and the latch is comfortable. Baby has had several successful feeds where he eats for at least 15 mins with a deep latch. However, he is too sleepy to feed sometimes. Discussed normal  feeding behavior during the first and second 24 hours of life. Reviewed benefits of skin to skin contact for mom and baby and for mom's milk production and supply. Encouraged mom to continue to offer the breast every 2-3 hours in skin to skin and to simply hold baby in skin to skin if he is too sleepy to latch within the first 24 hour period. Discussed differences between colostrum and mature milk and that full milk supply typically comes in 3-5 days after delivery. Mom needs a pump for at home, but unfortunately, her insurance will not cover a new pump as it has not been two years since she received her last pump from insurance. Mom states that she will not have a problem obtaining a pump for home, so he is ok with not receiving one through insurance at this time. Encouraged mom to call out for  a latch check today. Reviewed the outpatient lactation information.

## 2023-11-17 NOTE — CARE PLAN
Problem: Postpartum  Goal: Experiences normal postpartum course  Outcome: Progressing     Problem: Pain - Adult  Goal: Verbalizes/displays adequate comfort level or baseline comfort level  Outcome: Progressing     Problem: Safety - Adult  Goal: Free from fall injury  Outcome: Progressing

## 2023-11-17 NOTE — DISCHARGE INSTRUCTIONS

## 2023-11-18 VITALS
TEMPERATURE: 97.3 F | RESPIRATION RATE: 16 BRPM | DIASTOLIC BLOOD PRESSURE: 73 MMHG | SYSTOLIC BLOOD PRESSURE: 109 MMHG | HEART RATE: 67 BPM | OXYGEN SATURATION: 97 %

## 2023-11-18 PROCEDURE — 2500000001 HC RX 250 WO HCPCS SELF ADMINISTERED DRUGS (ALT 637 FOR MEDICARE OP): Performed by: STUDENT IN AN ORGANIZED HEALTH CARE EDUCATION/TRAINING PROGRAM

## 2023-11-18 RX ORDER — DOCUSATE SODIUM 100 MG/1
100 CAPSULE, LIQUID FILLED ORAL 2 TIMES DAILY PRN
Qty: 60 CAPSULE | Refills: 0 | Status: SHIPPED | OUTPATIENT
Start: 2023-11-18 | End: 2023-12-18

## 2023-11-18 RX ORDER — IBUPROFEN 600 MG/1
600 TABLET ORAL EVERY 6 HOURS PRN
Qty: 120 TABLET | Refills: 0 | Status: SHIPPED | OUTPATIENT
Start: 2023-11-18 | End: 2023-12-18

## 2023-11-18 RX ADMIN — IBUPROFEN 600 MG: 600 TABLET, FILM COATED ORAL at 06:16

## 2023-11-18 RX ADMIN — ACETAMINOPHEN 975 MG: 325 TABLET ORAL at 06:15

## 2023-11-18 ASSESSMENT — PAIN SCALES - GENERAL
PAINLEVEL_OUTOF10: 0 - NO PAIN

## 2023-11-18 NOTE — DISCHARGE SUMMARY
Discharge Summary    Admission Date: 2023  Discharge Date: 23  Discharge Diagnosis: Labor and delivery indication for care or intervention     Patient Active Problem List   Diagnosis    Supervision of other normal pregnancy, antepartum    Poor weight gain of pregnancy    Limited prenatal care in third trimester    Labor and delivery indication for care or intervention       Hospital Course  Yadira Kamara is a 23 y.o.,     Initially presented for: delivered in field    Admission Date: 2023    Delivery Date: 2023  10:29 PM     Delivery type: Vaginal, Spontaneous      GA at delivery: 38w4d    Outcome: Living     Anesthesia during delivery: None     Intrapartum complications: None     Feeding method: Breastfeeding Status: Yes    Contraception: Defers contraception to primary OB/PP visit. We discussed pregnancy spacing of at least one year, abstaining from intercourse for 6wks, and the ability to become pregnant in the absence of regular menses. Pt verbalized understanding.     Rhogam: The patient's blood type is O POS. The baby's blood type is A POS . Rhogam is not indicated.     Now postpartum day: 2.    Hospital course n/f:      PP course otherwise uneventful.  Meeting all postpartum milestones- ambulating independently, passing flatus, tolerating PO intake, lochia light, voiding spontaneously, and pain well controlled with PO meds.     Dispo  OK for DC today    Pertinent Physical Exam At Time of Discharge  General: well appearing, well nourished, postpartum  Obstetric: fundus firm at umbilicus, lochia light  Skin: Warm, dry; no rashes/lesions/erythema  Breast: No masses, nipple discharge  Neuro: A/Ox3, conversational, no gross motor deficit   GI: no distension, appropriately tender, soft, +BS  Respiratory: Even and unlabored on RA   Cardiovascular: No BLE edema; No erythema, warmth  Psych: appropriate mood and affect       Your medication list        START taking these medications   Patient's  to bedside, patient c/o nausea. Zofran given.       Instructions Last Dose Given Next Dose Due   docusate sodium 100 mg capsule  Commonly known as: Colace      Take 1 capsule (100 mg) by mouth 2 times a day as needed for constipation.       ibuprofen 600 mg tablet      Take 1 tablet (600 mg) by mouth every 6 hours if needed for moderate pain (4 - 6) (pain).              CONTINUE taking these medications        Instructions Last Dose Given Next Dose Due   Prenatal 27 mg iron-800 mcg folic acid tablet  Generic drug: prenatal vitamin (iron-folic)                     Where to Get Your Medications        These medications were sent to [id number] - [place], [place] - [address] AT [facility] [address]  [address], [place] [facility] [address]      Phone: [phone]   docusate sodium 100 mg capsule  ibuprofen 600 mg tablet           Outpatient Follow-Up  No future appointments.    YARY Marin-CNP

## 2023-11-18 NOTE — ED PROVIDER NOTES
"HPI   Chief Complaint   Patient presents with    Laboring       Patient is a 23-year-old -0-1-3 at 38 weeks and 4 days gestational age who delivered prior to presenting to the emergency department as a code pink.  Patient reportedly had been thalia for \"a few hours\", had no complications during pregnancy and had routine prenatal care.  She notes a \"gush of fluid\" just prior to delivery.  Placenta was delivered in route, small amount of bleeding postdelivery but no active hemorrhaging, hemodynamically stable in route and mentating appropriately per EMS.      History provided by:  EMS personnel   used: No                        No data recorded                Patient History   No past medical history on file.  No past surgical history on file.  No family history on file.  Social History     Tobacco Use    Smoking status: Never    Smokeless tobacco: Never   Vaping Use    Vaping Use: Never used   Substance Use Topics    Alcohol use: Not Currently    Drug use: Never       Physical Exam   ED Triage Vitals   Temp Heart Rate Resp BP   23 2251 23 2252 23 2249 23   36.3 °C (97.3 °F) 78 16 130/70      SpO2 Temp src Heart Rate Source Patient Position   23 -- -- --   95 %         BP Location FiO2 (%)     -- --             Physical Exam  Constitutional:       Appearance: Normal appearance.   HENT:      Head: Normocephalic and atraumatic.      Right Ear: External ear normal.      Left Ear: External ear normal.      Nose: Nose normal.      Mouth/Throat:      Mouth: Mucous membranes are moist.      Pharynx: Oropharynx is clear.   Eyes:      Extraocular Movements: Extraocular movements intact.      Conjunctiva/sclera: Conjunctivae normal.      Pupils: Pupils are equal, round, and reactive to light.   Cardiovascular:      Rate and Rhythm: Normal rate and regular rhythm.      Pulses: Normal pulses.      Heart sounds: Normal heart sounds.   Pulmonary:      Effort: " Pulmonary effort is normal.      Breath sounds: Normal breath sounds.   Abdominal:      Palpations: Abdomen is soft.      Comments: Appropriately distended, palpable contracted uterus below the level of the umbilicus, no rebound or guarding   Genitourinary:     Comments: Small amount of oozing blood from the introitus, no perineal or obvious vaginal lacerations  Musculoskeletal:         General: Normal range of motion.      Cervical back: Normal range of motion and neck supple.   Skin:     General: Skin is warm and dry.      Capillary Refill: Capillary refill takes less than 2 seconds.   Neurological:      Mental Status: She is alert and oriented to person, place, and time.   Psychiatric:         Mood and Affect: Mood normal.         Behavior: Behavior normal.         ED Course & MDM   ED Course as of 23 1321   Thu 2023   2322 Code pink seen upon arrival [LP]      ED Course User Index  [LP] Aysha Strauss DO         Diagnoses as of 23 1321   Supervision of other normal pregnancy, antepartum   Low weight gain during pregnancy in third trimester   Limited prenatal care in third trimester       Medical Decision Making  23-year-old -0-1-3 at 38 weeks 4 days gestational age who delivered prior to arrival as a Code Pink.  Patient presents hemodynamically stable, no acute distress, mentating appropriately, afebrile saturating well on room air.  Abdominal exam notable for soft, nondistended, no palpable abdominal masses, contracted uterus below the level of the umbilicus, small amount of vaginal bleeding with no obvious lacerations.  No signs of complication.  Pitocin given by OB/GYN at bedside, patient's pain controlled without medication, is hemodynamically stable, and appropriate for transfer to labor and delivery service.    Amount and/or Complexity of Data Reviewed  Independent Historian: EMS    Risk  Decision regarding hospitalization.        Procedure  Procedures     [place],  MD  Resident  11/18/23 3205

## 2023-11-18 NOTE — CARE PLAN
The patient's goals for the shift include adequate pain controlo    The clinical goals for the shift include BP below 160/110

## 2023-11-19 NOTE — SIGNIFICANT EVENT
Follow-up Phone Call Note:   Interview:  Care Type: Women's Health   Phone Number Call  [phone]   Call Outcome: No Answer-not accepting calls      Date/Time Of Call: 11/19/23 at 1249   Call Back Done By: care coordinator   Callback Complete:  Yes                                                                                                c

## 2024-02-14 ENCOUNTER — DOCUMENTATION (OUTPATIENT)
Dept: OBSTETRICS AND GYNECOLOGY | Facility: HOSPITAL | Age: 24
End: 2024-02-14
Payer: MEDICAID

## 2024-02-14 NOTE — PROGRESS NOTES
Certification of Live Birth paperwork filled out  Called patient to inform that it is ready for   Understanding verbalized  Dominga Nguyen RN

## 2024-02-27 ENCOUNTER — DOCUMENTATION (OUTPATIENT)
Dept: OBSTETRICS AND GYNECOLOGY | Facility: HOSPITAL | Age: 24
End: 2024-02-27
Payer: MEDICAID

## 2024-02-27 NOTE — PROGRESS NOTES
Patient's  picked up paperwork for proof of delivery as baby was born en route in [facility]  Informed  that we were able to fill out the paperwork but had to leave attendants signature blank as none of our providers witnessed the actual birth  Instructed  to take packet to city and see if they need that signed and take it to the appropriate place for signing which may be from the paramedic who was present in [facility]  Understanding verbalized  Dominga Nguyen RN

## 2024-06-04 NOTE — PROGRESS NOTES
"Yadira Kamara is a 23 y.o. here for IUD insertion. Patient had UPI 3 weeks ago.    After counseling, consenting and patient getting undressed and ready for the insertion process the patient declined the IUD insertion.  She declined any other birth control at this moment, but will think about her options and return to clinic when she is ready.    GynHx:  Patient's last menstrual period was 2024 (exact date).     Current contraception: None  HPV vaccination: No x0  Last pap: 21 WNL   STI testing today: Yes in urine     OB History          4    Para   3    Term   3       0    AB   1    Living   3         SAB   1    IAB   0    Ectopic   0    Multiple   0    Live Births   3                  History reviewed. No pertinent past medical history.    History reviewed. No pertinent surgical history.    Objective   /70   Ht 1.676 m (5' 6\")   Wt 78 kg (172 lb)   LMP 2024 (Exact Date)   Breastfeeding Yes   BMI 27.76 kg/m²     Physical Exam  Constitutional:       Appearance: Normal appearance.   Pulmonary:      Effort: Pulmonary effort is normal.   Neurological:      Mental Status: She is alert.   Psychiatric:         Mood and Affect: Mood normal.         Behavior: Behavior normal.         Thought Content: Thought content normal.         Judgment: Judgment normal.         Problem List Items Addressed This Visit    None  Visit Diagnoses       Screening examination for STI    -  Primary    Relevant Orders    C. Trachomatis / N. Gonorrhoeae, Amplified Detection    Trichomonas vaginalis, Nucleic Acid Detection    Encounter for IUD insertion        Relevant Medications    levonorgestrel (Mirena) 21 mcg/24 hr (8 yrs) 52 mg IUD (Start on 2024 12:15 PM)    Other Relevant Orders    POCT pregnancy, urine manually resulted (Completed)          Reviewed with patient about different birth control options including Nexplanon, Depo, pills, patch, ring    ALBERTINA Morris    "

## 2024-06-05 ENCOUNTER — PROCEDURE VISIT (OUTPATIENT)
Dept: OBSTETRICS AND GYNECOLOGY | Facility: HOSPITAL | Age: 24
End: 2024-06-05
Payer: MEDICAID

## 2024-06-05 VITALS
HEIGHT: 66 IN | BODY MASS INDEX: 27.64 KG/M2 | SYSTOLIC BLOOD PRESSURE: 105 MMHG | WEIGHT: 172 LBS | DIASTOLIC BLOOD PRESSURE: 70 MMHG

## 2024-06-05 DIAGNOSIS — Z30.09 BIRTH CONTROL COUNSELING: Primary | ICD-10-CM

## 2024-06-05 DIAGNOSIS — Z11.3 SCREENING EXAMINATION FOR STI: ICD-10-CM

## 2024-06-05 LAB — PREGNANCY TEST URINE, POC: NEGATIVE

## 2024-06-05 PROCEDURE — 87661 TRICHOMONAS VAGINALIS AMPLIF: CPT | Performed by: ADVANCED PRACTICE MIDWIFE

## 2024-06-05 PROCEDURE — 81025 URINE PREGNANCY TEST: CPT | Performed by: ADVANCED PRACTICE MIDWIFE

## 2024-06-05 PROCEDURE — 99213 OFFICE O/P EST LOW 20 MIN: CPT | Performed by: ADVANCED PRACTICE MIDWIFE

## 2024-06-05 PROCEDURE — 87491 CHLMYD TRACH DNA AMP PROBE: CPT | Performed by: ADVANCED PRACTICE MIDWIFE

## 2024-06-05 ASSESSMENT — PAIN SCALES - GENERAL: PAINLEVEL: 0-NO PAIN

## 2024-06-06 LAB
C TRACH RRNA SPEC QL NAA+PROBE: NEGATIVE
N GONORRHOEA DNA SPEC QL PROBE+SIG AMP: NEGATIVE
T VAGINALIS RRNA SPEC QL NAA+PROBE: NEGATIVE

## 2024-09-27 ENCOUNTER — ROUTINE PRENATAL (OUTPATIENT)
Dept: OBSTETRICS AND GYNECOLOGY | Facility: CLINIC | Age: 24
End: 2024-09-27
Payer: MEDICAID

## 2024-09-27 ENCOUNTER — PHARMACY VISIT (OUTPATIENT)
Dept: PHARMACY | Facility: CLINIC | Age: 24
End: 2024-09-27
Payer: MEDICAID

## 2024-09-27 VITALS
HEART RATE: 73 BPM | DIASTOLIC BLOOD PRESSURE: 74 MMHG | BODY MASS INDEX: 28.22 KG/M2 | SYSTOLIC BLOOD PRESSURE: 110 MMHG | HEIGHT: 65 IN | WEIGHT: 169.38 LBS

## 2024-09-27 DIAGNOSIS — Z32.01 PREGNANCY TEST POSITIVE (HHS-HCC): Primary | ICD-10-CM

## 2024-09-27 DIAGNOSIS — Z3A.11 11 WEEKS GESTATION OF PREGNANCY (HHS-HCC): ICD-10-CM

## 2024-09-27 LAB — PREGNANCY TEST URINE, POC: POSITIVE

## 2024-09-27 PROCEDURE — 99213 OFFICE O/P EST LOW 20 MIN: CPT | Performed by: ADVANCED PRACTICE MIDWIFE

## 2024-09-27 PROCEDURE — RXMED WILLOW AMBULATORY MEDICATION CHARGE

## 2024-09-27 PROCEDURE — 99213 OFFICE O/P EST LOW 20 MIN: CPT | Mod: TH | Performed by: ADVANCED PRACTICE MIDWIFE

## 2024-09-27 PROCEDURE — 87086 URINE CULTURE/COLONY COUNT: CPT | Performed by: ADVANCED PRACTICE MIDWIFE

## 2024-09-27 PROCEDURE — 81025 URINE PREGNANCY TEST: CPT | Performed by: ADVANCED PRACTICE MIDWIFE

## 2024-09-27 ASSESSMENT — EDINBURGH POSTNATAL DEPRESSION SCALE (EPDS)
I HAVE BEEN ABLE TO LAUGH AND SEE THE FUNNY SIDE OF THINGS: AS MUCH AS I ALWAYS COULD
I HAVE BLAMED MYSELF UNNECESSARILY WHEN THINGS WENT WRONG: NO, NEVER
THE THOUGHT OF HARMING MYSELF HAS OCCURRED TO ME: NEVER
I HAVE BEEN ANXIOUS OR WORRIED FOR NO GOOD REASON: YES, SOMETIMES
I HAVE BEEN SO UNHAPPY THAT I HAVE HAD DIFFICULTY SLEEPING: NOT AT ALL
I HAVE FELT SAD OR MISERABLE: NOT VERY OFTEN
I HAVE FELT SCARED OR PANICKY FOR NO GOOD REASON: NO, NOT AT ALL
I HAVE BEEN SO UNHAPPY THAT I HAVE BEEN CRYING: NO, NEVER
THINGS HAVE BEEN GETTING ON TOP OF ME: NO, MOST OF THE TIME I HAVE COPED QUITE WELL
I HAVE LOOKED FORWARD WITH ENJOYMENT TO THINGS: AS MUCH AS I EVER DID
TOTAL SCORE: 4

## 2024-09-27 ASSESSMENT — PAIN - FUNCTIONAL ASSESSMENT: PAIN_FUNCTIONAL_ASSESSMENT: 0-10

## 2024-09-27 ASSESSMENT — PAIN SCALES - GENERAL: PAINLEVEL_OUTOF10: 0 - NO PAIN

## 2024-09-27 NOTE — PROGRESS NOTES
Subjective   Patient ID [id number]   Yadira Kamara is a 24 y.o.  at 11w0d with a working estimated date of delivery of 2025, by Last Menstrual Period who presents for an initial prenatal visit. This pregnancy is planned.    Her pregnancy is complicated by:  Precipitous  in ambulance with most recent birth   Desires PPIUD    OB History    Para Term  AB Living   5 3 3 0 1 3   SAB IAB Ectopic Multiple Live Births   1 0 0 0 3      # Outcome Date GA Lbr Holger/2nd Weight Sex Type Anes PTL Lv   5 Current            4 Term 23 38w4d  3.64 kg M Vag-Spont None N BARB   3 Term 22 38w6d  3.544 kg M Vag-Spont None N BARB   2 SAB 2021           1 Term 10/26/20 40w0d  3.43 kg M Vag-Spont None N BARB     [place]  Depression Scale Total: 4    Objective   Physical Exam  Weight: 76.8 kg (169 lb 6 oz)  Expected Total Weight Gain: 7 kg (15 lb)-11.5 kg (25 lb)   Pregravid BMI: 27.96  BP: 110/74    Fetal Heart Rate:  (BILL)   Physical Exam  Vitals reviewed.   Constitutional:       General: She is not in acute distress.     Appearance: Normal appearance. She is normal weight.   HENT:      Head: Normocephalic.   Eyes:      Pupils: Pupils are equal, round, and reactive to light.   Cardiovascular:      Rate and Rhythm: Normal rate and regular rhythm.      Heart sounds: No murmur heard.     No gallop.   Pulmonary:      Effort: Pulmonary effort is normal. No respiratory distress.      Breath sounds: Normal breath sounds. No stridor. No wheezing, rhonchi or rales.   Chest:   Breasts:     Left: Normal.   Abdominal:      General: Abdomen is flat.      Palpations: Abdomen is soft. There is no mass.      Tenderness: There is no abdominal tenderness. There is no right CVA tenderness, left CVA tenderness or rebound.      Hernia: No hernia is present.   Genitourinary:     General: Normal vulva.      Pubic Area: No rash.       Labia:         Right: No rash, tenderness, lesion or injury.         Left: No  rash, tenderness, lesion or injury.       Urethra: No prolapse or urethral swelling.      Vagina: Normal. No foreign body. No erythema, tenderness, bleeding, lesions or prolapsed vaginal walls.      Cervix: No cervical motion tenderness, friability or lesion.      Uterus: Normal. Not enlarged, not tender and no uterine prolapse.       Adnexa: Right adnexa normal and left adnexa normal.        Right: No mass or tenderness.          Left: No mass or tenderness.        Rectum: Normal.      Comments: EGBUS WNL   Musculoskeletal:      Cervical back: Neck supple. No rigidity or tenderness.   Skin:     General: Skin is warm and dry.   Neurological:      Mental Status: She is alert.   Psychiatric:         Mood and Affect: Mood normal.         Behavior: Behavior normal.         Thought Content: Thought content normal.         Judgment: Judgment normal.          Prenatal Labs  + cffDNA + PAP    Assessment/Plan   Problem List Items Addressed This Visit             ICD-10-CM       Ob-Gyn Problems    11 weeks gestation of pregnancy ([facility]) Z3A.11    Relevant Medications    prenatal vit 14-iron fum-folic 29 mg iron- 1 mg tablet,chewable    Other Relevant Orders    CBC Anemia Panel With Reflex,Pregnancy    Type And Screen    Syphilis Screen with Reflex    Rubella Antibody, IgG    Hepatitis C Antibody    Urine Culture    Hepatitis B Surface Antigen    HIV 1/2 Antigen/Antibody Screen with Reflex to Confirmation    Varicella Zoster Antibody, IgG    US MAC OB imaging order    THINPREP PAP TEST    Myriad Prequel Prenatal Screen    Cystic Fibrosis Carrier Screening    SMA Carrier Screening     Other Visit Diagnoses         Codes    Pregnancy test positive ([facility])    -  Primary Z32.01    Relevant Medications    prenatal vit 14-iron fum-folic 29 mg iron- 1 mg tablet,chewable    Other Relevant Orders    POCT pregnancy, urine manually resulted (Completed)    CBC Anemia Panel With Reflex,Pregnancy    Type And Screen    Syphilis Screen  with Reflex    Rubella Antibody, IgG    Hepatitis C Antibody    Urine Culture    Hepatitis B Surface Antigen    HIV 1/2 Antigen/Antibody Screen with Reflex to Confirmation    Varicella Zoster Antibody, IgG    US MAC OB imaging order    Hemoglobin A1C    THINPREP PAP TEST    Myriad Prequel Prenatal Screen    Cystic Fibrosis Carrier Screening    SMA Carrier Screening            Immunizations: reviewed   Prenatal Labs ordered  Daily prenatal vitamins prescribed  First trimester screening and second trimester screening discussed. Patient decided to Pre-test genetic counseling discussed and included:   Interpretation of family and medical histories to assess the probability of disease occurrence or recurrence;   Education about inheritance, genetic testing, disease management, prevention and resources  Counseling to promote informed choices and adaptation to the risk or presented of a genetic condition  Counseling for psychological aspects of genetic testing.    Follow up in 4 weeks for return OB visit.

## 2024-09-27 NOTE — LETTER
9/27/2024    To Whom It May Concern:    Yadira Kamara is being followed for her pregnancy at [facility].  Estimated Date of Delivery: 4/18/25    Sincerely,        YARY Salmeron-SOFIA  [facility]

## 2024-09-27 NOTE — LETTER
9/27/2024    To Whom It May Concern:    This is to certify that my patient,Yadira Kamara is under my care for her pregnancy.    The following guidelines may be used for any dental work:  You may use a local anesthetic with or without Epinephrine.  You may prescribe any Penicillin or Cephalosporin if an antibiotic is necessary. (Dependent on allergy history)  You may take x-rays with a lead apron if necessary.  You may prescribe Tylenol and/or narcotics for pain.  You may extract teeth if necessary.    If you need further information or if you have any concerns, please contact our office.    Sincerely,        ALBERTINA Salmeron   [facility]

## 2024-09-29 LAB — BACTERIA UR CULT: NO GROWTH

## 2024-10-08 ENCOUNTER — HOSPITAL ENCOUNTER (OUTPATIENT)
Dept: RADIOLOGY | Facility: HOSPITAL | Age: 24
End: 2024-10-08
Payer: MEDICAID

## 2024-10-09 ENCOUNTER — HOSPITAL ENCOUNTER (OUTPATIENT)
Dept: RADIOLOGY | Facility: CLINIC | Age: 24
Discharge: HOME | End: 2024-10-09
Payer: MEDICAID

## 2024-10-09 ENCOUNTER — LAB (OUTPATIENT)
Dept: LAB | Facility: LAB | Age: 24
End: 2024-10-09
Payer: MEDICAID

## 2024-10-09 DIAGNOSIS — Z3A.11 11 WEEKS GESTATION OF PREGNANCY (HHS-HCC): ICD-10-CM

## 2024-10-09 DIAGNOSIS — Z36.82 ENCOUNTER FOR NUCHAL TRANSLUCENCY TESTING (HHS-HCC): ICD-10-CM

## 2024-10-09 DIAGNOSIS — Z32.01 PREGNANCY TEST POSITIVE (HHS-HCC): ICD-10-CM

## 2024-10-09 LAB
ERYTHROCYTE [DISTWIDTH] IN BLOOD BY AUTOMATED COUNT: 12.6 % (ref 11.5–14.5)
EST. AVERAGE GLUCOSE BLD GHB EST-MCNC: 85 MG/DL
HBA1C MFR BLD: 4.6 %
HBV SURFACE AG SERPL QL IA: NONREACTIVE
HCT VFR BLD AUTO: 37.3 % (ref 36–46)
HCV AB SER QL: NONREACTIVE
HGB BLD-MCNC: 12.7 G/DL (ref 12–16)
HIV 1+2 AB+HIV1 P24 AG SERPL QL IA: NONREACTIVE
MCH RBC QN AUTO: 28.4 PG (ref 26–34)
MCHC RBC AUTO-ENTMCNC: 34 G/DL (ref 32–36)
MCV RBC AUTO: 83 FL (ref 80–100)
NRBC BLD-RTO: 0 /100 WBCS (ref 0–0)
PLATELET # BLD AUTO: 263 X10*3/UL (ref 150–450)
RBC # BLD AUTO: 4.47 X10*6/UL (ref 4–5.2)
REFLEX ADDED, ANEMIA PANEL: NORMAL
TREPONEMA PALLIDUM IGG+IGM AB [PRESENCE] IN SERUM OR PLASMA BY IMMUNOASSAY: NONREACTIVE
WBC # BLD AUTO: 4.9 X10*3/UL (ref 4.4–11.3)

## 2024-10-09 PROCEDURE — 86780 TREPONEMA PALLIDUM: CPT

## 2024-10-09 PROCEDURE — 86850 RBC ANTIBODY SCREEN: CPT

## 2024-10-09 PROCEDURE — 83036 HEMOGLOBIN GLYCOSYLATED A1C: CPT

## 2024-10-09 PROCEDURE — 86900 BLOOD TYPING SEROLOGIC ABO: CPT

## 2024-10-09 PROCEDURE — 86317 IMMUNOASSAY INFECTIOUS AGENT: CPT

## 2024-10-09 PROCEDURE — 81220 CFTR GENE COM VARIANTS: CPT

## 2024-10-09 PROCEDURE — 87389 HIV-1 AG W/HIV-1&-2 AB AG IA: CPT

## 2024-10-09 PROCEDURE — 76813 OB US NUCHAL MEAS 1 GEST: CPT | Performed by: STUDENT IN AN ORGANIZED HEALTH CARE EDUCATION/TRAINING PROGRAM

## 2024-10-09 PROCEDURE — 86787 VARICELLA-ZOSTER ANTIBODY: CPT

## 2024-10-09 PROCEDURE — 36415 COLL VENOUS BLD VENIPUNCTURE: CPT

## 2024-10-09 PROCEDURE — 85027 COMPLETE CBC AUTOMATED: CPT

## 2024-10-09 PROCEDURE — 87340 HEPATITIS B SURFACE AG IA: CPT

## 2024-10-09 PROCEDURE — 81329 SMN1 GENE DOS/DELETION ALYS: CPT

## 2024-10-09 PROCEDURE — 76813 OB US NUCHAL MEAS 1 GEST: CPT

## 2024-10-09 PROCEDURE — 86901 BLOOD TYPING SEROLOGIC RH(D): CPT

## 2024-10-09 PROCEDURE — 86803 HEPATITIS C AB TEST: CPT

## 2024-10-10 LAB
ABO GROUP (TYPE) IN BLOOD: NORMAL
ANTIBODY SCREEN: NORMAL
RH FACTOR (ANTIGEN D): NORMAL
RUBV IGG SERPL IA-ACNC: 5.7 IA
RUBV IGG SERPL QL IA: POSITIVE
VARICELLA ZOSTER IGG INDEX: 1.2 IA
VZV IGG SER QL IA: POSITIVE

## 2024-10-14 ENCOUNTER — APPOINTMENT (OUTPATIENT)
Dept: OBSTETRICS AND GYNECOLOGY | Facility: CLINIC | Age: 24
End: 2024-10-14
Payer: MEDICAID

## 2024-10-14 VITALS — DIASTOLIC BLOOD PRESSURE: 60 MMHG | WEIGHT: 168.2 LBS | SYSTOLIC BLOOD PRESSURE: 110 MMHG | BODY MASS INDEX: 27.99 KG/M2

## 2024-10-14 DIAGNOSIS — Z3A.13 13 WEEKS GESTATION OF PREGNANCY (HHS-HCC): Primary | ICD-10-CM

## 2024-10-14 PROCEDURE — 99213 OFFICE O/P EST LOW 20 MIN: CPT | Performed by: ADVANCED PRACTICE MIDWIFE

## 2024-10-14 ASSESSMENT — ENCOUNTER SYMPTOMS
ENDOCRINE NEGATIVE: 0
HEMATOLOGIC/LYMPHATIC NEGATIVE: 0
CONSTITUTIONAL NEGATIVE: 0
ALLERGIC/IMMUNOLOGIC NEGATIVE: 0
RESPIRATORY NEGATIVE: 0
GASTROINTESTINAL NEGATIVE: 0
NEUROLOGICAL NEGATIVE: 0
EYES NEGATIVE: 0
CARDIOVASCULAR NEGATIVE: 0
MUSCULOSKELETAL NEGATIVE: 0
PSYCHIATRIC NEGATIVE: 0

## 2024-10-14 NOTE — PROGRESS NOTES
Subjective   Yadira Kamara is a 24 y.o.  at 13w2d with a working estimated date of delivery of 2025, by Ultrasound who presents for a routine prenatal visit.     She denies vaginal bleeding, abdominal pain, leakage of fluid.     Objective   Physical Exam  Weight: 76.3 kg (168 lb 3.2 oz)  TW.091 kg (3.2 oz)   BP: 110/60  Fetal Heart Rate: 153      Prenatal Labs  Lab Results   Component Value Date    HGB 12.7 10/09/2024    HCT 37.3 10/09/2024     10/09/2024    ABO O 10/09/2024    LABRH POS 10/09/2024    NEISSGONOAMP Negative 2024    CHLAMTRACAMP Negative 2024    SYPHT Nonreactive 10/09/2024    HEPBSAG Nonreactive 10/09/2024    HIV1X2 Nonreactive 10/09/2024    URINECULTURE No growth 2024     NIPT: pending    Imaging  The most recent ultrasound was performed on 10/9 with a study GA of 12w4. NT =  WNL.     Assessment/Plan   Problem List Items Addressed This Visit          Ob-Gyn Problems    13 weeks gestation of pregnancy ([facility]) - Primary    Overview     Desired provider in labor: [x] CNM  [] Physician  [x] Blood Products: [x] Yes, accepts [] No, needs counseling  [x] Initial BMI: Could not be calculated   [x] Prenatal Labs:   [x] Cervical Cancer Screening up to date ** pending   [x] Rh status: RH+  [x] Genetic Screening:  ordered  [x] NT US: (11-13 wks)  [] Baby ASA (if indicated): n/a  [x] Pregnancy dated by: 12w4d US     [] Anatomy US: (19-20 wks)  [] Federal Sterilization consent signed (if indicated):  [] 1hr GCT at 24-28wks:  [] Rhogam (if indicated):   [] Fetal Surveillance (if indicated):  [] Tdap (27-32 wks, may be given up to 36 wks if initial window missed):   [] RSV (32-36 wks) (Sept. to end of ):   [] Flu Vaccine:    [] Breastfeeding:  [x] Postpartum Birth control method: PPIUD Para CINDI!!!!!  [] GBS at 36 - 37 wks:  [] 39 weeks discussion of IOL vs. Expectant management:  [] Mode of delivery ( anticipated ):           Anatomy US scheduled   Reviewed routine  lab results    cffDNA pending   Reviewed warning signs and when to call provider  Follow up in 4 weeks for a routine prenatal visit.    YARY Salmeron-SOFIA

## 2024-10-15 PROBLEM — Z3A.13 13 WEEKS GESTATION OF PREGNANCY (HHS-HCC): Status: ACTIVE | Noted: 2024-09-27

## 2024-10-15 LAB
CYTOLOGY CMNT CVX/VAG CYTO-IMP: NORMAL
HPV HR GENOTYPES PNL CVX NAA+PROBE: NEGATIVE
LAB AP HPV GENOTYPE QUESTION: NO
LAB AP HPV HR: NORMAL
LAB AP PAP ADDITIONAL TESTS: NORMAL
LABORATORY COMMENT REPORT: NORMAL
LMP START DATE: NORMAL
PATH REPORT.TOTAL CANCER: NORMAL

## 2024-10-16 LAB
CFTR MUT ANL BLD/T: NORMAL
ELECTRONICALLY SIGNED BY: NORMAL

## 2024-10-18 LAB
ELECTRONICALLY SIGNED BY: NORMAL
SMA RESULT: NORMAL

## 2024-10-21 LAB
COMMENTS - MP RESULT TYPE: NORMAL
SCAN RESULT: NORMAL

## 2024-10-25 PROCEDURE — RXMED WILLOW AMBULATORY MEDICATION CHARGE

## 2024-11-05 ENCOUNTER — PHARMACY VISIT (OUTPATIENT)
Dept: PHARMACY | Facility: CLINIC | Age: 24
End: 2024-11-05
Payer: MEDICAID

## 2024-11-15 ENCOUNTER — APPOINTMENT (OUTPATIENT)
Dept: OBSTETRICS AND GYNECOLOGY | Facility: CLINIC | Age: 24
End: 2024-11-15
Payer: MEDICAID

## 2024-11-15 VITALS — BODY MASS INDEX: 28.29 KG/M2 | DIASTOLIC BLOOD PRESSURE: 60 MMHG | SYSTOLIC BLOOD PRESSURE: 104 MMHG | WEIGHT: 170 LBS

## 2024-11-15 DIAGNOSIS — Z3A.17 17 WEEKS GESTATION OF PREGNANCY (HHS-HCC): Primary | ICD-10-CM

## 2024-11-15 ASSESSMENT — ENCOUNTER SYMPTOMS
NEUROLOGICAL NEGATIVE: 0
CARDIOVASCULAR NEGATIVE: 0
PSYCHIATRIC NEGATIVE: 0
CONSTITUTIONAL NEGATIVE: 0
RESPIRATORY NEGATIVE: 0
MUSCULOSKELETAL NEGATIVE: 0
ENDOCRINE NEGATIVE: 0
HEMATOLOGIC/LYMPHATIC NEGATIVE: 0
ALLERGIC/IMMUNOLOGIC NEGATIVE: 0
EYES NEGATIVE: 0
GASTROINTESTINAL NEGATIVE: 0

## 2024-11-15 NOTE — PROGRESS NOTES
Ob Visit  11/15/24     SUBJECTIVE      HPI: Yadira Kamara is a 24 y.o.  at 17w6d here for RPNV.  She has no contractions, no bleeding, or no LOF. Reports no fetal movement. Patient reports no complaints        OBJECTIVE  Visit Vitals  /60   Wt 77.1 kg (170 lb)   LMP 2024 (Exact Date)   BMI 28.29 kg/m²   OB Status Pregnant   Smoking Status Never   BSA 1.88 m²            ASSESSMENT & PLAN    Yadira Kamara is a 24 y.o.  at 17w6d here for the following concerns we addressed today:    Problem List Items Addressed This Visit          Ob-Gyn Problems    17 weeks gestation of pregnancy ([facility]) - Primary    Overview     Desired provider in labor: [x] CNM  [] Physician  [x] Blood Products: [x] Yes, accepts [] No, needs counseling  [x] Initial BMI: Could not be calculated   [x] Prenatal Labs:   [x] Cervical Cancer Screening up to date ** pending   [x] Rh status: RH+  [x] Genetic Screening:  ordered  [x] NT US: (11-13 wks)  [] Baby ASA (if indicated): n/a  [x] Pregnancy dated by: 12w4d US     [] Anatomy US: (19-20 wks)  [] Federal Sterilization consent signed (if indicated):  [] 1hr GCT at 24-28wks:  [] Rhogam (if indicated):   [] Fetal Surveillance (if indicated):  [] Tdap (27-32 wks, may be given up to 36 wks if initial window missed):   [] RSV (32-36 wks) (Sept. to end of ):   [] Flu Vaccine:    [] Breastfeeding:  [x] Postpartum Birth control method: PPIUD Para CINDI!!!!!  [] GBS at 36 - 37 wks:  [] 39 weeks discussion of IOL vs. Expectant management:  [] Mode of delivery ( anticipated ):               RTC in 4 weeks      YARY Salmeron-SOFIA

## 2024-11-22 ENCOUNTER — HOSPITAL ENCOUNTER (OUTPATIENT)
Dept: RADIOLOGY | Facility: CLINIC | Age: 24
Discharge: HOME | End: 2024-11-22
Payer: MEDICAID

## 2024-11-22 DIAGNOSIS — Z34.90 ENCOUNTER FOR SUPERVISION OF NORMAL PREGNANCY, UNSPECIFIED, UNSPECIFIED TRIMESTER: ICD-10-CM

## 2024-11-22 DIAGNOSIS — Z3A.11 11 WEEKS GESTATION OF PREGNANCY (HHS-HCC): ICD-10-CM

## 2024-11-22 DIAGNOSIS — Z32.01 PREGNANCY TEST POSITIVE (HHS-HCC): ICD-10-CM

## 2024-11-22 PROCEDURE — 76805 OB US >/= 14 WKS SNGL FETUS: CPT

## 2024-11-29 ENCOUNTER — PHARMACY VISIT (OUTPATIENT)
Dept: PHARMACY | Facility: CLINIC | Age: 24
End: 2024-11-29
Payer: MEDICAID

## 2024-11-29 PROCEDURE — RXMED WILLOW AMBULATORY MEDICATION CHARGE

## 2024-12-09 ENCOUNTER — APPOINTMENT (OUTPATIENT)
Dept: OBSTETRICS AND GYNECOLOGY | Facility: CLINIC | Age: 24
End: 2024-12-09
Payer: MEDICAID

## 2024-12-09 VITALS — SYSTOLIC BLOOD PRESSURE: 102 MMHG | WEIGHT: 167 LBS | DIASTOLIC BLOOD PRESSURE: 52 MMHG | BODY MASS INDEX: 27.79 KG/M2

## 2024-12-09 DIAGNOSIS — Z3A.21 21 WEEKS GESTATION OF PREGNANCY (HHS-HCC): Primary | ICD-10-CM

## 2024-12-09 DIAGNOSIS — O26.12 LOW WEIGHT GAIN DURING PREGNANCY IN SECOND TRIMESTER (HHS-HCC): ICD-10-CM

## 2024-12-09 PROCEDURE — 99213 OFFICE O/P EST LOW 20 MIN: CPT | Performed by: ADVANCED PRACTICE MIDWIFE

## 2024-12-09 NOTE — PROGRESS NOTES
Ob Visit  24     SUBJECTIVE      HPI: Yadira Kamara is a 24 y.o.  at 21w2d here for RPNV.  She has no contractions, no bleeding, or no LOF. Reports normal fetal movement. Patient reports back pains occasionally when over doing it- a little sick has a cough       OBJECTIVE  Visit Vitals  /52   Wt 75.8 kg (167 lb)   LMP 2024 (Exact Date)   BMI 27.79 kg/m²   OB Status Pregnant   Smoking Status Never   BSA 1.86 m²            ASSESSMENT & PLAN    Yadira Kamara is a 24 y.o.  at 21w2d here for the following concerns we addressed today:    Problem List Items Addressed This Visit          Ob-Gyn Problems    21 weeks gestation of pregnancy ([facility]) - Primary    Overview     Desired provider in labor: [x] CNM  [] Physician  [x] Blood Products: [x] Yes, accepts [] No, needs counseling  [x] Initial BMI: Could not be calculated   [x] Prenatal Labs:   [x] Cervical Cancer Screening up to date ** pending   [x] Rh status: RH+  [x] Genetic Screening:  ordered  [x] NT US: (11-13 wks)  [] Baby ASA (if indicated): n/a  [x] Pregnancy dated by: 12w4d US     [x] Anatomy US: (19-20 wks)  [] Federal Sterilization consent signed (if indicated):  [] 1hr GCT at 24-28wks:  [][] Fetal Surveillance (if indicated):  [] Tdap (27-32 wks, may be given up to 36 wks if initial window missed):   [] RSV (32-36 wks) (Sept. to end of ):   [] Flu Vaccine:    [] Breastfeeding:  [x] Postpartum Birth control method: PPIUD Para CINDI!!!!!  [] GBS at 36 - 37 wks:  [] 39 weeks discussion of IOL vs. Expectant management:  [] Mode of delivery ( anticipated ):          Poor weight gain of pregnancy ([facility])    Overview     -reviewed nutritional intervention  Will do growth US 28wga [  ]    2lb weight gain so far during pregnancy  Fundal height measured wnl   Patient denies food insecurity ; she just isn't eating  Strongly encouraged patient to eat more, high protein foods                   RTC in 4 weeks      Julianna MANZO  YARY Mcnulty-SOFIA

## 2025-01-05 DIAGNOSIS — Z32.01 PREGNANCY TEST POSITIVE (HHS-HCC): ICD-10-CM

## 2025-01-05 DIAGNOSIS — Z3A.11 11 WEEKS GESTATION OF PREGNANCY (HHS-HCC): ICD-10-CM

## 2025-01-06 PROCEDURE — RXMED WILLOW AMBULATORY MEDICATION CHARGE

## 2025-01-06 RX ORDER — PRENATAL VIT 14/IRON FUM/FOLIC 29 MG-1 MG
1 TABLET,CHEWABLE ORAL DAILY
Qty: 90 EACH | Refills: 0 | Status: SHIPPED | OUTPATIENT
Start: 2025-01-06

## 2025-01-10 ENCOUNTER — PHARMACY VISIT (OUTPATIENT)
Dept: PHARMACY | Facility: CLINIC | Age: 25
End: 2025-01-10
Payer: MEDICAID

## 2025-01-10 ENCOUNTER — ROUTINE PRENATAL (OUTPATIENT)
Dept: OBSTETRICS AND GYNECOLOGY | Facility: CLINIC | Age: 25
End: 2025-01-10
Payer: MEDICAID

## 2025-01-10 VITALS
DIASTOLIC BLOOD PRESSURE: 72 MMHG | WEIGHT: 173.8 LBS | BODY MASS INDEX: 28.92 KG/M2 | HEART RATE: 92 BPM | SYSTOLIC BLOOD PRESSURE: 105 MMHG

## 2025-01-10 DIAGNOSIS — Z87.59 HISTORY OF PRECIPITOUS DELIVERY: ICD-10-CM

## 2025-01-10 DIAGNOSIS — Z3A.26 26 WEEKS GESTATION OF PREGNANCY (HHS-HCC): Primary | ICD-10-CM

## 2025-01-10 PROBLEM — Z3A.25 25 WEEKS GESTATION OF PREGNANCY (HHS-HCC): Status: ACTIVE | Noted: 2024-09-27

## 2025-01-10 PROCEDURE — 99213 OFFICE O/P EST LOW 20 MIN: CPT | Performed by: ADVANCED PRACTICE MIDWIFE

## 2025-01-10 PROCEDURE — 99213 OFFICE O/P EST LOW 20 MIN: CPT | Mod: TH | Performed by: ADVANCED PRACTICE MIDWIFE

## 2025-01-10 ASSESSMENT — ENCOUNTER SYMPTOMS
ALLERGIC/IMMUNOLOGIC NEGATIVE: 0
GASTROINTESTINAL NEGATIVE: 0
RESPIRATORY NEGATIVE: 0
ENDOCRINE NEGATIVE: 0
CONSTITUTIONAL NEGATIVE: 0
CARDIOVASCULAR NEGATIVE: 0
MUSCULOSKELETAL NEGATIVE: 0
NEUROLOGICAL NEGATIVE: 0
EYES NEGATIVE: 0
HEMATOLOGIC/LYMPHATIC NEGATIVE: 0
PSYCHIATRIC NEGATIVE: 0

## 2025-01-10 ASSESSMENT — PATIENT HEALTH QUESTIONNAIRE - PHQ9
2. FEELING DOWN, DEPRESSED OR HOPELESS: NOT AT ALL
SUM OF ALL RESPONSES TO PHQ9 QUESTIONS 1 AND 2: 0
1. LITTLE INTEREST OR PLEASURE IN DOING THINGS: NOT AT ALL

## 2025-01-10 ASSESSMENT — PAIN SCALES - GENERAL: PAINLEVEL_OUTOF10: 0 - NO PAIN

## 2025-01-10 ASSESSMENT — PAIN - FUNCTIONAL ASSESSMENT: PAIN_FUNCTIONAL_ASSESSMENT: 0-10

## 2025-01-10 NOTE — PROGRESS NOTES
Ob Visit  01/10/25     SUBJECTIVE      HPI: Yadira Kamara is a 24 y.o.  at 25w6d here for RPNV.  She has no contractions, no bleeding, or no LOF. Reports normal fetal movement. Patient reports DIANA        OBJECTIVE  Visit Vitals  /72   Pulse 92   Wt 78.8 kg (173 lb 12.8 oz)   LMP 2024 (Exact Date)   BMI 28.92 kg/m²   OB Status Pregnant   Smoking Status Never   BSA 1.9 m²            ASSESSMENT & PLAN    Yadira Kamara is a 24 y.o.  at 25w6d here for the following concerns we addressed today:    Problem List Items Addressed This Visit          Ob-Gyn Problems    History of precipitous delivery    Overview     Precip 8lb baby in ambulance after SROM last birth         25 weeks gestation of pregnancy ([facility]) - Primary    Overview     Desired provider in labor: [x] CNM  [] Physician  [x] Blood Products: [x] Yes, accepts [] No, needs counseling  [x] Initial BMI: Could not be calculated   [x] Prenatal Labs:   [x] Cervical Cancer Screening up to date ** pending   [x] Rh status: RH+  [x] Genetic Screening:  ordered  [x] NT US: (11-13 wks)  [] Baby ASA (if indicated): n/a  [x] Pregnancy dated by: 12w4d US     [x] Anatomy US: (19-20 wks)  [] Federal Sterilization consent signed (if indicated):  [] 1hr GCT at 24-28wks:  [][] Fetal Surveillance (if indicated):  [] Tdap (27-32 wks, may be given up to 36 wks if initial window missed):   [] RSV (32-36 wks) (Sept. to end of ):   [] Flu Vaccine:    [] Breastfeeding:  [x] Postpartum Birth control method: PPIUD Para CINDI!!!!!  [] GBS at 36 - 37 wks:  [] 39 weeks discussion of IOL vs. Expectant management:  [] Mode of delivery ( anticipated ):             GDM test ordered  PTL Labor precautions, FM and other pregnancy warning signs reviewed with patient    RTC in 3 weeks      Julianna Mcnulty, YARY-SOFIA

## 2025-02-05 ENCOUNTER — LAB (OUTPATIENT)
Dept: LAB | Facility: HOSPITAL | Age: 25
End: 2025-02-05
Payer: MEDICAID

## 2025-02-05 ENCOUNTER — PHARMACY VISIT (OUTPATIENT)
Dept: PHARMACY | Facility: CLINIC | Age: 25
End: 2025-02-05
Payer: MEDICAID

## 2025-02-05 DIAGNOSIS — Z3A.26 26 WEEKS GESTATION OF PREGNANCY (HHS-HCC): ICD-10-CM

## 2025-02-05 LAB
ERYTHROCYTE [DISTWIDTH] IN BLOOD BY AUTOMATED COUNT: 13 % (ref 11.5–14.5)
HCT VFR BLD AUTO: 36.4 % (ref 36–46)
HGB BLD-MCNC: 12.3 G/DL (ref 12–16)
MCH RBC QN AUTO: 30.1 PG (ref 26–34)
MCHC RBC AUTO-ENTMCNC: 33.8 G/DL (ref 32–36)
MCV RBC AUTO: 89 FL (ref 80–100)
NRBC BLD-RTO: 0 /100 WBCS (ref 0–0)
PLATELET # BLD AUTO: 252 X10*3/UL (ref 150–450)
RBC # BLD AUTO: 4.09 X10*6/UL (ref 4–5.2)
REFLEX ADDED, ANEMIA PANEL: NORMAL
WBC # BLD AUTO: 7.2 X10*3/UL (ref 4.4–11.3)

## 2025-02-05 PROCEDURE — RXMED WILLOW AMBULATORY MEDICATION CHARGE

## 2025-02-05 PROCEDURE — 85027 COMPLETE CBC AUTOMATED: CPT

## 2025-02-07 ENCOUNTER — ROUTINE PRENATAL (OUTPATIENT)
Dept: OBSTETRICS AND GYNECOLOGY | Facility: CLINIC | Age: 25
End: 2025-02-07
Payer: MEDICAID

## 2025-02-07 VITALS — SYSTOLIC BLOOD PRESSURE: 103 MMHG | DIASTOLIC BLOOD PRESSURE: 68 MMHG | BODY MASS INDEX: 29.5 KG/M2 | WEIGHT: 177.3 LBS

## 2025-02-07 DIAGNOSIS — Z87.59 HISTORY OF PRECIPITOUS DELIVERY: ICD-10-CM

## 2025-02-07 DIAGNOSIS — Z3A.30 30 WEEKS GESTATION OF PREGNANCY (HHS-HCC): Primary | ICD-10-CM

## 2025-02-07 DIAGNOSIS — Z28.21 TETANUS, DIPHTHERIA, AND ACELLULAR PERTUSSIS (TDAP) VACCINATION DECLINED: ICD-10-CM

## 2025-02-07 DIAGNOSIS — O26.843 UTERINE SIZE-DATE DISCREPANCY IN THIRD TRIMESTER (HHS-HCC): ICD-10-CM

## 2025-02-07 PROBLEM — O09.33 LIMITED PRENATAL CARE IN THIRD TRIMESTER: Status: RESOLVED | Noted: 2023-11-10 | Resolved: 2025-02-07

## 2025-02-07 PROCEDURE — 99213 OFFICE O/P EST LOW 20 MIN: CPT | Mod: TH | Performed by: ADVANCED PRACTICE MIDWIFE

## 2025-02-07 ASSESSMENT — PATIENT HEALTH QUESTIONNAIRE - PHQ9
1. LITTLE INTEREST OR PLEASURE IN DOING THINGS: NOT AT ALL
2. FEELING DOWN, DEPRESSED OR HOPELESS: NOT AT ALL
SUM OF ALL RESPONSES TO PHQ9 QUESTIONS 1 AND 2: 0

## 2025-02-07 ASSESSMENT — ENCOUNTER SYMPTOMS
DEPRESSION: 0
LOSS OF SENSATION IN FEET: 0
OCCASIONAL FEELINGS OF UNSTEADINESS: 0

## 2025-02-07 ASSESSMENT — COLUMBIA-SUICIDE SEVERITY RATING SCALE - C-SSRS
2. HAVE YOU ACTUALLY HAD ANY THOUGHTS OF KILLING YOURSELF?: NO
1. IN THE PAST MONTH, HAVE YOU WISHED YOU WERE DEAD OR WISHED YOU COULD GO TO SLEEP AND NOT WAKE UP?: NO
6. HAVE YOU EVER DONE ANYTHING, STARTED TO DO ANYTHING, OR PREPARED TO DO ANYTHING TO END YOUR LIFE?: NO

## 2025-02-07 NOTE — PROGRESS NOTES
Ob Visit  25     SUBJECTIVE      HPI: Yadira Kamara is a 24 y.o.  at 30w0d here for RPNV.  She has no contractions, no bleeding, or no LOF. Reports normal fetal movement. Patient reports no issues.       OBJECTIVE  Visit Vitals  /68   Wt 80.4 kg (177 lb 4.8 oz)   LMP 2024 (Exact Date)   BMI 29.50 kg/m²   OB Status Pregnant   Smoking Status Never   BSA 1.92 m²            ASSESSMENT & PLAN    Yadira Kamara is a 24 y.o.  at 30w0d here for the following concerns we addressed today:    Problem List Items Addressed This Visit          Ob-Gyn Problems    30 weeks gestation of pregnancy ([facility]) - Primary    Overview     Desired provider in labor: [x] CNM  [] Physician  [x] Blood Products: [x] Yes, accepts [] No, needs counseling  [x] Initial BMI: Could not be calculated   [x] Prenatal Labs:   [x] Cervical Cancer Screening up to date ** pending   [x] Rh status: RH+  [x] Genetic Screening:  ordered  [x] NT US: (11-13 wks)  [] Baby ASA (if indicated): n/a  [x] Pregnancy dated by: 12w4d US     [x] Anatomy US: (19-20 wks)  [] [x] 1hr GCT at 24-28wks:  [][] [x] Tdap (27-32 wks, may be given up to 36 wks if initial window missed): declines  [] [x] Flu Vaccine: declined    [x] Breastfeeding: needs pump  [x] Postpartum Birth control method: PPIUD Para CINDI!!!!!  [] GBS at 36 - 37 wks:  [] 39 weeks discussion of IOL vs. Expectant management:  [] Mode of delivery ( anticipated ):          Relevant Orders    US MAC OB imaging order    History of precipitous delivery    Overview     Precip 8lb baby in ambulance after SROM last birth          Other Visit Diagnoses       Uterine size-date discrepancy in third trimester ([facility])        Relevant Orders    US MAC OB imaging order              RTC in 2 weeks      Julianna Mcnulty, YARY-SOFIA

## 2025-02-08 LAB
GLUCOSE 1H P 50 G GLC PO SERPL-MCNC: 93 MG/DL
T PALLIDUM AB SER QL IA: NEGATIVE

## 2025-02-12 ENCOUNTER — HOSPITAL ENCOUNTER (OUTPATIENT)
Dept: RADIOLOGY | Facility: CLINIC | Age: 25
Discharge: HOME | End: 2025-02-12
Payer: MEDICAID

## 2025-02-12 DIAGNOSIS — Z3A.30 30 WEEKS GESTATION OF PREGNANCY (HHS-HCC): ICD-10-CM

## 2025-02-12 DIAGNOSIS — O26.843 UTERINE SIZE-DATE DISCREPANCY IN THIRD TRIMESTER (HHS-HCC): ICD-10-CM

## 2025-02-12 DIAGNOSIS — O40.3XX0 POLYHYDRAMNIOS AFFECTING PREGNANCY IN THIRD TRIMESTER (HHS-HCC): ICD-10-CM

## 2025-02-12 PROCEDURE — 76816 OB US FOLLOW-UP PER FETUS: CPT

## 2025-02-12 PROCEDURE — 76819 FETAL BIOPHYS PROFIL W/O NST: CPT

## 2025-02-21 ENCOUNTER — ROUTINE PRENATAL (OUTPATIENT)
Dept: OBSTETRICS AND GYNECOLOGY | Facility: CLINIC | Age: 25
End: 2025-02-21
Payer: MEDICAID

## 2025-02-21 VITALS — DIASTOLIC BLOOD PRESSURE: 65 MMHG | BODY MASS INDEX: 29.95 KG/M2 | SYSTOLIC BLOOD PRESSURE: 93 MMHG | WEIGHT: 180 LBS

## 2025-02-21 DIAGNOSIS — Z3A.32 32 WEEKS GESTATION OF PREGNANCY (HHS-HCC): Primary | ICD-10-CM

## 2025-02-21 DIAGNOSIS — Z34.93: ICD-10-CM

## 2025-02-21 DIAGNOSIS — Z87.59 HISTORY OF PRECIPITOUS DELIVERY: ICD-10-CM

## 2025-02-21 DIAGNOSIS — O40.3XX0 POLYHYDRAMNIOS IN THIRD TRIMESTER COMPLICATION, SINGLE OR UNSPECIFIED FETUS (HHS-HCC): ICD-10-CM

## 2025-02-21 PROCEDURE — 99214 OFFICE O/P EST MOD 30 MIN: CPT | Mod: TH | Performed by: ADVANCED PRACTICE MIDWIFE

## 2025-02-21 ASSESSMENT — ENCOUNTER SYMPTOMS
LOSS OF SENSATION IN FEET: 0
HEMATOLOGIC/LYMPHATIC NEGATIVE: 0
ALLERGIC/IMMUNOLOGIC NEGATIVE: 0
DEPRESSION: 0
PSYCHIATRIC NEGATIVE: 0
GASTROINTESTINAL NEGATIVE: 0
OCCASIONAL FEELINGS OF UNSTEADINESS: 0
MUSCULOSKELETAL NEGATIVE: 0
RESPIRATORY NEGATIVE: 0
ENDOCRINE NEGATIVE: 0
CONSTITUTIONAL NEGATIVE: 0
NEUROLOGICAL NEGATIVE: 0
CARDIOVASCULAR NEGATIVE: 0
EYES NEGATIVE: 0

## 2025-02-21 ASSESSMENT — PATIENT HEALTH QUESTIONNAIRE - PHQ9
2. FEELING DOWN, DEPRESSED OR HOPELESS: NOT AT ALL
1. LITTLE INTEREST OR PLEASURE IN DOING THINGS: NOT AT ALL
SUM OF ALL RESPONSES TO PHQ9 QUESTIONS 1 AND 2: 0
2. FEELING DOWN, DEPRESSED OR HOPELESS: NOT AT ALL
1. LITTLE INTEREST OR PLEASURE IN DOING THINGS: NOT AT ALL
SUM OF ALL RESPONSES TO PHQ9 QUESTIONS 1 AND 2: 0

## 2025-02-21 NOTE — PROGRESS NOTES
Ob Visit  25     SUBJECTIVE      HPI: Yadira Kamara is a 24 y.o.  at 32w0d here for RPNV.  She has no contractions, no bleeding, or no LOF. Reports normal fetal movement. Patient reports US.       OBJECTIVE  Visit Vitals  BP 93/65   Wt 81.6 kg (180 lb)   LMP 2024 (Exact Date)   BMI 29.95 kg/m²   OB Status Pregnant   Smoking Status Never   BSA 1.93 m²            ASSESSMENT & PLAN    Yadira Kamara is a 24 y.o.  at 32w0d here for the following concerns we addressed today:    Problem List Items Addressed This Visit       32 weeks gestation of pregnancy ([facility]) - Primary    Overview     Desired provider in labor: [x] CNM  [] Physician  [x] Blood Products: [x] Yes, accepts [] No, needs counseling  [x] Initial BMI: 27.96   [x] Prenatal Labs:   [x] Cervical Cancer Screening up to date ** pending   [x] Rh status: RH+  [x] Genetic Screening:  ordered  [x] NT US: (11-13 wks)  [] Baby ASA (if indicated): n/a  [x] Pregnancy dated by: 12w4d US     [x] Anatomy US: (19-20 wks)  [] [x] 1hr GCT at 24-28wks:  [][] [x] Tdap (27-32 wks, may be given up to 36 wks if initial window missed): declines  [] [x] Flu Vaccine: declined    [x] Breastfeeding: needs pump  [x] Postpartum Birth control method: PPIUD Para CINDI!!!!!  [] GBS at 36 - 37 wks:  [] 39 weeks discussion of IOL vs. Expectant management:  [] Mode of delivery ( anticipated ):          Relevant Orders    Follow Up 2 weeks    Fetal size consistent with dates during pregnancy in third trimester ([facility])    Overview     Ac97th%tile  OYC04yk%tile         History of precipitous delivery    Overview     Precip 8lb baby in ambulance after SROM last birth         Polyhydramnios in third trimester ([facility])    Overview     ASTRID 26  Repeat in 4weeks              RTC in 2 weeks      Julianna Mcnulty, YARY-SOFIA

## 2025-02-21 NOTE — PROGRESS NOTES
Subjective   Patient ID [id number]   Yadira Kamara is a 24 y.o.  at 32w0d with a working estimated date of delivery of 2025, by Last Menstrual Period who presents for an initial prenatal visit. This pregnancy is {pregnancy; planned/unplanned:54084}.    Her pregnancy is complicated by:  ***    OB History    Para Term  AB Living   5 3 3 0 1 3   SAB IAB Ectopic Multiple Live Births   1 0 0 0 3      # Outcome Date GA Lbr Holger/2nd Weight Sex Type Anes PTL Lv   5 Current            4 Term 23 38w4d  3.64 kg M Vag-Spont None N BARB   3 Term 22 38w6d  3.544 kg M Vag-Spont None N BARB   2 SAB 2021           1 Term 10/26/20 40w0d  3.43 kg M Vag-Spont None N BARB          Objective   Physical Exam     Expected Total Weight Gain: 7 kg (15 lb)-11.5 kg (25 lb)   Pregravid BMI: 27.96             OBGyn Exam    Prenatal Labs  ***    Assessment/Plan   {Assess/Plan SmartLinks (Optional):14537}    Immunizations: ***  Prenatal Labs ordered  Daily prenatal vitamins prescribed  First trimester screening and second trimester screening discussed. Patient decided to ***  Follow up in 4 weeks for return OB visit.

## 2025-03-05 ENCOUNTER — HOSPITAL ENCOUNTER (OUTPATIENT)
Facility: HOSPITAL | Age: 25
Discharge: HOME | End: 2025-03-05
Attending: STUDENT IN AN ORGANIZED HEALTH CARE EDUCATION/TRAINING PROGRAM | Admitting: STUDENT IN AN ORGANIZED HEALTH CARE EDUCATION/TRAINING PROGRAM
Payer: MEDICAID

## 2025-03-05 ENCOUNTER — HOSPITAL ENCOUNTER (OUTPATIENT)
Facility: HOSPITAL | Age: 25
End: 2025-03-05
Attending: STUDENT IN AN ORGANIZED HEALTH CARE EDUCATION/TRAINING PROGRAM | Admitting: STUDENT IN AN ORGANIZED HEALTH CARE EDUCATION/TRAINING PROGRAM
Payer: MEDICAID

## 2025-03-05 VITALS
OXYGEN SATURATION: 100 % | HEIGHT: 65 IN | BODY MASS INDEX: 29.86 KG/M2 | TEMPERATURE: 96.8 F | HEART RATE: 77 BPM | SYSTOLIC BLOOD PRESSURE: 114 MMHG | WEIGHT: 179.23 LBS | DIASTOLIC BLOOD PRESSURE: 65 MMHG | RESPIRATION RATE: 18 BRPM

## 2025-03-05 LAB
BILIRUBIN, POC: NEGATIVE
BLOOD URINE, POC: NEGATIVE
CLARITY, POC: CLEAR
COLOR, POC: YELLOW
GLUCOSE URINE, POC: NEGATIVE
KETONES, POC: NEGATIVE
LEUKOCYTE EST, POC: NEGATIVE
NITRITE, POC: NEGATIVE
PH, POC: 6
POC APPEARANCE OF BODY FLUID: CLEAR
SPECIFIC GRAVITY, POC: 1.02
URINE PROTEIN, POC: NEGATIVE
UROBILINOGEN, POC: 1

## 2025-03-05 PROCEDURE — 59025 FETAL NON-STRESS TEST: CPT | Performed by: STUDENT IN AN ORGANIZED HEALTH CARE EDUCATION/TRAINING PROGRAM

## 2025-03-05 PROCEDURE — 99215 OFFICE O/P EST HI 40 MIN: CPT

## 2025-03-05 PROCEDURE — 2500000001 HC RX 250 WO HCPCS SELF ADMINISTERED DRUGS (ALT 637 FOR MEDICARE OP): Mod: SE | Performed by: STUDENT IN AN ORGANIZED HEALTH CARE EDUCATION/TRAINING PROGRAM

## 2025-03-05 PROCEDURE — 81002 URINALYSIS NONAUTO W/O SCOPE: CPT | Performed by: STUDENT IN AN ORGANIZED HEALTH CARE EDUCATION/TRAINING PROGRAM

## 2025-03-05 PROCEDURE — 99213 OFFICE O/P EST LOW 20 MIN: CPT | Performed by: STUDENT IN AN ORGANIZED HEALTH CARE EDUCATION/TRAINING PROGRAM

## 2025-03-05 PROCEDURE — 4500999001 HC ED NO CHARGE

## 2025-03-05 RX ORDER — HYDRALAZINE HYDROCHLORIDE 20 MG/ML
5 INJECTION INTRAMUSCULAR; INTRAVENOUS ONCE AS NEEDED
Status: DISCONTINUED | OUTPATIENT
Start: 2025-03-05 | End: 2025-03-05 | Stop reason: HOSPADM

## 2025-03-05 RX ORDER — LIDOCAINE HYDROCHLORIDE 10 MG/ML
0.5 INJECTION, SOLUTION INFILTRATION; PERINEURAL ONCE AS NEEDED
Status: DISCONTINUED | OUTPATIENT
Start: 2025-03-05 | End: 2025-03-05 | Stop reason: HOSPADM

## 2025-03-05 RX ORDER — LABETALOL HYDROCHLORIDE 5 MG/ML
20 INJECTION, SOLUTION INTRAVENOUS ONCE AS NEEDED
Status: DISCONTINUED | OUTPATIENT
Start: 2025-03-05 | End: 2025-03-05 | Stop reason: HOSPADM

## 2025-03-05 RX ORDER — ONDANSETRON 4 MG/1
4 TABLET, FILM COATED ORAL EVERY 6 HOURS PRN
Status: DISCONTINUED | OUTPATIENT
Start: 2025-03-05 | End: 2025-03-05 | Stop reason: HOSPADM

## 2025-03-05 RX ORDER — NIFEDIPINE 10 MG/1
10 CAPSULE ORAL ONCE AS NEEDED
Status: DISCONTINUED | OUTPATIENT
Start: 2025-03-05 | End: 2025-03-05 | Stop reason: HOSPADM

## 2025-03-05 RX ORDER — CYCLOBENZAPRINE HCL 10 MG
10 TABLET ORAL ONCE
Status: COMPLETED | OUTPATIENT
Start: 2025-03-05 | End: 2025-03-05

## 2025-03-05 RX ORDER — ACETAMINOPHEN 325 MG/1
975 TABLET ORAL ONCE
Status: COMPLETED | OUTPATIENT
Start: 2025-03-05 | End: 2025-03-05

## 2025-03-05 RX ORDER — ONDANSETRON HYDROCHLORIDE 2 MG/ML
4 INJECTION, SOLUTION INTRAVENOUS EVERY 6 HOURS PRN
Status: DISCONTINUED | OUTPATIENT
Start: 2025-03-05 | End: 2025-03-05 | Stop reason: HOSPADM

## 2025-03-05 RX ADMIN — ACETAMINOPHEN 975 MG: 325 TABLET ORAL at 16:59

## 2025-03-05 RX ADMIN — CYCLOBENZAPRINE 10 MG: 10 TABLET, FILM COATED ORAL at 16:59

## 2025-03-05 SDOH — ECONOMIC STABILITY: HOUSING INSECURITY: DO YOU FEEL UNSAFE GOING BACK TO THE PLACE WHERE YOU ARE LIVING?: NO

## 2025-03-05 SDOH — SOCIAL STABILITY: SOCIAL INSECURITY: HAS ANYONE EVER THREATENED TO HURT YOUR FAMILY OR YOUR PETS?: NO

## 2025-03-05 SDOH — HEALTH STABILITY: MENTAL HEALTH: WISH TO BE DEAD (PAST 1 MONTH): NO

## 2025-03-05 SDOH — SOCIAL STABILITY: SOCIAL INSECURITY: VERBAL ABUSE: DENIES

## 2025-03-05 SDOH — HEALTH STABILITY: MENTAL HEALTH: HAVE YOU USED ANY SUBSTANCES (CANABIS, COCAINE, HEROIN, HALLUCINOGENS, INHALANTS, ETC.) IN THE PAST 12 MONTHS?: NO

## 2025-03-05 SDOH — SOCIAL STABILITY: SOCIAL INSECURITY: ABUSE SCREEN: ADULT

## 2025-03-05 SDOH — SOCIAL STABILITY: SOCIAL INSECURITY: DO YOU FEEL ANYONE HAS EXPLOITED OR TAKEN ADVANTAGE OF YOU FINANCIALLY OR OF YOUR PERSONAL PROPERTY?: NO

## 2025-03-05 SDOH — SOCIAL STABILITY: SOCIAL INSECURITY: ARE THERE ANY APPARENT SIGNS OF INJURIES/BEHAVIORS THAT COULD BE RELATED TO ABUSE/NEGLECT?: NO

## 2025-03-05 SDOH — SOCIAL STABILITY: SOCIAL INSECURITY: HAVE YOU HAD ANY THOUGHTS OF HARMING ANYONE ELSE?: NO

## 2025-03-05 SDOH — HEALTH STABILITY: MENTAL HEALTH: SUICIDAL BEHAVIOR (LIFETIME): NO

## 2025-03-05 SDOH — HEALTH STABILITY: MENTAL HEALTH: NON-SPECIFIC ACTIVE SUICIDAL THOUGHTS (PAST 1 MONTH): NO

## 2025-03-05 SDOH — HEALTH STABILITY: MENTAL HEALTH: WERE YOU ABLE TO COMPLETE ALL THE BEHAVIORAL HEALTH SCREENINGS?: YES

## 2025-03-05 SDOH — HEALTH STABILITY: MENTAL HEALTH: HAVE YOU USED ANY PRESCRIPTION DRUGS OTHER THAN PRESCRIBED IN THE PAST 12 MONTHS?: NO

## 2025-03-05 SDOH — SOCIAL STABILITY: SOCIAL INSECURITY: ARE YOU OR HAVE YOU BEEN THREATENED OR ABUSED PHYSICALLY, EMOTIONALLY, OR SEXUALLY BY ANYONE?: NO

## 2025-03-05 SDOH — SOCIAL STABILITY: SOCIAL INSECURITY: HAVE YOU HAD THOUGHTS OF HARMING ANYONE ELSE?: NO

## 2025-03-05 SDOH — SOCIAL STABILITY: SOCIAL INSECURITY: PHYSICAL ABUSE: DENIES

## 2025-03-05 SDOH — SOCIAL STABILITY: SOCIAL INSECURITY: DOES ANYONE TRY TO KEEP YOU FROM HAVING/CONTACTING OTHER FRIENDS OR DOING THINGS OUTSIDE YOUR HOME?: NO

## 2025-03-05 ASSESSMENT — PAIN SCALES - GENERAL
PAINLEVEL_OUTOF10: 4
PAINLEVEL_OUTOF10: 7

## 2025-03-05 ASSESSMENT — PATIENT HEALTH QUESTIONNAIRE - PHQ9
1. LITTLE INTEREST OR PLEASURE IN DOING THINGS: NOT AT ALL
SUM OF ALL RESPONSES TO PHQ9 QUESTIONS 1 & 2: 0
2. FEELING DOWN, DEPRESSED OR HOPELESS: NOT AT ALL

## 2025-03-05 ASSESSMENT — LIFESTYLE VARIABLES
AUDIT-C TOTAL SCORE: 0
SKIP TO QUESTIONS 9-10: 1
HOW OFTEN DO YOU HAVE 6 OR MORE DRINKS ON ONE OCCASION: NEVER
HOW OFTEN DO YOU HAVE A DRINK CONTAINING ALCOHOL: NEVER
AUDIT-C TOTAL SCORE: 0
HOW MANY STANDARD DRINKS CONTAINING ALCOHOL DO YOU HAVE ON A TYPICAL DAY: PATIENT DOES NOT DRINK

## 2025-03-05 ASSESSMENT — PAIN - FUNCTIONAL ASSESSMENT: PAIN_FUNCTIONAL_ASSESSMENT: 0-10

## 2025-03-05 ASSESSMENT — COLUMBIA-SUICIDE SEVERITY RATING SCALE - C-SSRS
1. IN THE PAST MONTH, HAVE YOU WISHED YOU WERE DEAD OR WISHED YOU COULD GO TO SLEEP AND NOT WAKE UP?: NO
6. HAVE YOU EVER DONE ANYTHING, STARTED TO DO ANYTHING, OR PREPARED TO DO ANYTHING TO END YOUR LIFE?: NO
2. HAVE YOU ACTUALLY HAD ANY THOUGHTS OF KILLING YOURSELF?: NO

## 2025-03-05 NOTE — H&P
OB Triage H&P    Assessment/Plan    Yadira Kamara is a 24 y.o.  at 33w5d, DIANA: 2025, by Last Menstrual Period, who presents to triage with abdominal pain and DFM.    Plan    Abdominal Pain  - SVE 1/thick/hi  - abdomen soft/benign   - UA n/f SG 1.020, negative leuks, nitrites, blood, ketones   - given Tylenol/Flexeril  - low suspicion for PTL, abruption, acute abdomen   - suspect MSK pain of pregnancy     DFM  - subjectively feeling movement now in triage   - NST reactive and reassuring     Maternal Well-being  - Vital signs stable and WNL  - Emotional support and reassurance provided  - All questions and concerns addressed    IUP @ 33w5d  - prenatal lab work reviewed   - normal 1 hr GTT  - last growth  EFW 1904g 86%, AC 97%  - mild polyhydramnios - 26.7 on   - NST reactive  - continue routine prenatal care, next appointment 3/7    Dispo  -Patient appropriate for discharge home, agrees with plan  -Return precautions discussed   -Follow up at next scheduled OB appointment or to triage sooner as needed    Discussed plan and reviewed with: Dr. Lino Rivera PA-C  25 5:56 PM  Vocera    Pregnancy Problems (from 24 to present)       Problem Noted Diagnosed Resolved    Polyhydramnios in third trimester ([facility]) 2025 by ALBERTINA Salmeron  No    Priority:  Medium       Overview Signed 2025  6:30 AM by ALBERTINA Salmeron     ASTRID 26  Repeat in 4weeks         Fetal size consistent with dates during pregnancy in third trimester ([facility]) 2025 by ALBERTINA Salmeron  No    Priority:  Medium       Overview Signed 2025  6:30 AM by ALBERTINA Salmeron     Ac97th%tile  ZID14jg%tile         32 weeks gestation of pregnancy ([facility]) 2024 by ALBERTINA Salmeron  No    Priority:  Medium       Overview Addendum 2025  5:02 PM by Julianna Mcnulty APRN-GAVIM     Desired provider in labor: [x] CNM  []  Physician  [x] Blood Products: [x] Yes, accepts [] No, needs counseling  [x] Initial BMI: 27.96   [x] Prenatal Labs:   [x] Cervical Cancer Screening up to date ** pending   [x] Rh status: RH+  [x] Genetic Screening:  ordered  [x] NT US: (11-13 wks)  [] Baby ASA (if indicated): n/a  [x] Pregnancy dated by: 12w4d US     [x] Anatomy US: (19-20 wks)  [] [x] 1hr GCT at 24-28wks:  [][] [x] Tdap (27-32 wks, may be given up to 36 wks if initial window missed): declines  [] [x] Flu Vaccine: declined    [x] Breastfeeding: needs pump  [x] Postpartum Birth control method: PPIUD Para CINDI!!!!!  [] GBS at 36 - 37 wks:  [] 39 weeks discussion of IOL vs. Expectant management:  [] Mode of delivery ( anticipated ):                  Jareth Kamara is a 24 y.o.  at 33w5d by LMP c/w 12w5d US who presents to triage for abdominal pain, low back pain, and DFM. For 2 days has noticed intermittent left-lower abdominal pain and low back pain that is described as burning. Improves with rest/lying down. Also notes in the last 2 days since pain started she has not felt the baby move. Reports regular bowel movements. Denies n/v/d. Tolerating PO like normal. Denies dysuria or vaginal infection symptoms. No history of abdominal surgeries in the past. No recent falls/injuries to belly.     Prenatal Provider Julianna Mcnulty CNM     OB History    Para Term  AB Living   5 3 3 0 1 3   SAB IAB Ectopic Multiple Live Births   1 0 0 0 3      # Outcome Date GA Lbr Holger/2nd Weight Sex Type Anes PTL Lv   5 Current            4 Term 23 38w4d  3.64 kg M Vag-Spont None N BARB      Name: BERKLEY KAMARA      Apgar1: 7  Apgar5: 7   3 Term 22 38w6d  3.544 kg M Vag-Spont None N BARB   2 SAB 2021           1 Term 10/26/20 40w0d  3.43 kg M Vag-Spont None N BARB       No past surgical history on file.    Social History     Tobacco Use    Smoking status: Never    Smokeless tobacco: Never   Substance Use Topics     Alcohol use: Not Currently       No Known Allergies    No medications prior to admission.     Objective     Last Vitals  Temp Pulse Resp BP MAP O2 Sat   36 °C (96.8 °F) 77 18  (pt left prior to discharge vitals) 86 100 %     Blood Pressures         3/5/2025  1554 3/5/2025  1622 3/5/2025  1745       BP: 108/58 114/65 --              Physical Exam  General: NAD, mood appropriate  Cardiopulmonary: warm and well perfused, breathing comfortably on room air  Abdomen: Gravid, non-tender  Extremities: Symmetric  Cervix: 1 /0 /-3     Chaperone Present: Yes.  Chaperone Name/Title: NICKOLAS Gerard  Examination Chaperoned: Gynecological Exam     Fetal Monitoring  Baseline: 140 bpm, Variability: moderate,  Accelerations: present and Decelerations: none  Uterine Activity: No contractions seen on toco  Interpretation: Reactive    Labs in chart were reviewed.

## 2025-03-07 ENCOUNTER — PHARMACY VISIT (OUTPATIENT)
Dept: PHARMACY | Facility: CLINIC | Age: 25
End: 2025-03-07
Payer: MEDICAID

## 2025-03-07 ENCOUNTER — ROUTINE PRENATAL (OUTPATIENT)
Dept: OBSTETRICS AND GYNECOLOGY | Facility: CLINIC | Age: 25
End: 2025-03-07
Payer: MEDICAID

## 2025-03-07 VITALS — SYSTOLIC BLOOD PRESSURE: 105 MMHG | WEIGHT: 180.6 LBS | BODY MASS INDEX: 30.09 KG/M2 | DIASTOLIC BLOOD PRESSURE: 68 MMHG

## 2025-03-07 DIAGNOSIS — O40.3XX0 POLYHYDRAMNIOS IN THIRD TRIMESTER COMPLICATION, SINGLE OR UNSPECIFIED FETUS (HHS-HCC): ICD-10-CM

## 2025-03-07 DIAGNOSIS — Z87.59 HISTORY OF PRECIPITOUS DELIVERY: ICD-10-CM

## 2025-03-07 DIAGNOSIS — Z34.93: ICD-10-CM

## 2025-03-07 DIAGNOSIS — Z3A.34 34 WEEKS GESTATION OF PREGNANCY (HHS-HCC): Primary | ICD-10-CM

## 2025-03-07 PROCEDURE — RXMED WILLOW AMBULATORY MEDICATION CHARGE

## 2025-03-07 PROCEDURE — 99213 OFFICE O/P EST LOW 20 MIN: CPT | Mod: TH | Performed by: ADVANCED PRACTICE MIDWIFE

## 2025-03-07 NOTE — PROGRESS NOTES
Ob Visit  25     SUBJECTIVE      HPI: Yadira Kamara is a 24 y.o.  at 34w0d here for RPNV.  She has intermittent contractions, no bleeding, or no LOF. Reports normal fetal movement. Patient reports going to L&D for back pain and possible decreased fetal movement- was found to be 1 cm.       OBJECTIVE  Visit Vitals  /68 Comment: 78   Wt 81.9 kg (180 lb 9.6 oz)   LMP 2024 (Exact Date)   BMI 30.09 kg/m²   OB Status Pregnant   Smoking Status Never   BSA 1.94 m²            ASSESSMENT & PLAN    Yadira Kamara is a 24 y.o.  at 34w0d here for the following concerns we addressed today:    Problem List Items Addressed This Visit       34 weeks gestation of pregnancy ([facility]) - Primary    Overview     Desired provider in labor: [x] CNM  [] Physician  [x] Blood Products: [x] Yes, accepts [] No, needs counseling  [x] Initial BMI: 27.96   [x] Prenatal Labs:   [x] Cervical Cancer Screening up to date ** pending   [x] Rh status: RH+  [x] Genetic Screening:  ordered  [x] NT US: (11-13 wks)  [] Baby ASA (if indicated): n/a  [x] Pregnancy dated by: 12w4d US     [x] Anatomy US: (19-20 wks)  [] [x] 1hr GCT at 24-28wks:  [][] [x] Tdap (27-32 wks, may be given up to 36 wks if initial window missed): declines  [] [x] Flu Vaccine: declined    [x] Breastfeeding: needs pump  [x] Postpartum Birth control method: PPIUD Para CINDI!!!!!  [] GBS at 36 - 37 wks:  [] 39 weeks discussion of IOL vs. Expectant management:  [] Mode of delivery ( anticipated ):          Fetal size consistent with dates during pregnancy in third trimester ([facility])    Overview     Ac97th%tile  WOX62ad%tile         History of precipitous delivery    Overview     Precip 8lb baby in ambulance after SROM last birth         Polyhydramnios in third trimester ([facility])    Overview     ASTRID 26  Repeat in 4weeks            US scheduled  Wednesday! To recheck fluid levels    RTC in 2 weeks      Julianna Mcnulty, APRN-CNM

## 2025-03-12 ENCOUNTER — HOSPITAL ENCOUNTER (OUTPATIENT)
Dept: RADIOLOGY | Facility: CLINIC | Age: 25
Discharge: HOME | End: 2025-03-12
Payer: MEDICAID

## 2025-03-12 DIAGNOSIS — O40.3XX0 POLYHYDRAMNIOS AFFECTING PREGNANCY IN THIRD TRIMESTER (HHS-HCC): ICD-10-CM

## 2025-03-12 PROCEDURE — 76816 OB US FOLLOW-UP PER FETUS: CPT

## 2025-03-12 PROCEDURE — 76819 FETAL BIOPHYS PROFIL W/O NST: CPT

## 2025-03-21 ENCOUNTER — ROUTINE PRENATAL (OUTPATIENT)
Dept: OBSTETRICS AND GYNECOLOGY | Facility: CLINIC | Age: 25
End: 2025-03-21
Payer: MEDICAID

## 2025-03-21 VITALS — DIASTOLIC BLOOD PRESSURE: 77 MMHG | BODY MASS INDEX: 29.97 KG/M2 | WEIGHT: 179.9 LBS | SYSTOLIC BLOOD PRESSURE: 120 MMHG

## 2025-03-21 DIAGNOSIS — Z34.93: ICD-10-CM

## 2025-03-21 DIAGNOSIS — Z87.59 HISTORY OF PRECIPITOUS DELIVERY: ICD-10-CM

## 2025-03-21 DIAGNOSIS — O40.3XX0 POLYHYDRAMNIOS IN THIRD TRIMESTER COMPLICATION, SINGLE OR UNSPECIFIED FETUS (HHS-HCC): ICD-10-CM

## 2025-03-21 DIAGNOSIS — Z3A.36 36 WEEKS GESTATION OF PREGNANCY (HHS-HCC): Primary | ICD-10-CM

## 2025-03-21 PROCEDURE — 99213 OFFICE O/P EST LOW 20 MIN: CPT | Performed by: ADVANCED PRACTICE MIDWIFE

## 2025-03-21 PROCEDURE — 99213 OFFICE O/P EST LOW 20 MIN: CPT | Mod: TH | Performed by: ADVANCED PRACTICE MIDWIFE

## 2025-03-21 ASSESSMENT — PATIENT HEALTH QUESTIONNAIRE - PHQ9
SUM OF ALL RESPONSES TO PHQ9 QUESTIONS 1 AND 2: 0
1. LITTLE INTEREST OR PLEASURE IN DOING THINGS: NOT AT ALL
2. FEELING DOWN, DEPRESSED OR HOPELESS: NOT AT ALL

## 2025-03-21 ASSESSMENT — ENCOUNTER SYMPTOMS
NEUROLOGICAL NEGATIVE: 0
EYES NEGATIVE: 0
OCCASIONAL FEELINGS OF UNSTEADINESS: 0
ENDOCRINE NEGATIVE: 0
PSYCHIATRIC NEGATIVE: 0
CARDIOVASCULAR NEGATIVE: 0
LOSS OF SENSATION IN FEET: 0
ALLERGIC/IMMUNOLOGIC NEGATIVE: 0
CONSTITUTIONAL NEGATIVE: 0
DEPRESSION: 0
HEMATOLOGIC/LYMPHATIC NEGATIVE: 0
RESPIRATORY NEGATIVE: 0
GASTROINTESTINAL NEGATIVE: 0
MUSCULOSKELETAL NEGATIVE: 0

## 2025-03-21 ASSESSMENT — COLUMBIA-SUICIDE SEVERITY RATING SCALE - C-SSRS
6. HAVE YOU EVER DONE ANYTHING, STARTED TO DO ANYTHING, OR PREPARED TO DO ANYTHING TO END YOUR LIFE?: NO
1. IN THE PAST MONTH, HAVE YOU WISHED YOU WERE DEAD OR WISHED YOU COULD GO TO SLEEP AND NOT WAKE UP?: NO
2. HAVE YOU ACTUALLY HAD ANY THOUGHTS OF KILLING YOURSELF?: NO

## 2025-03-21 NOTE — PROGRESS NOTES
Ob Visit  25     SUBJECTIVE      HPI: Yadira Kaamra is a 24 y.o.  at 36w0d here for RPNV.  She has no contractions, no bleeding, or no LOF. Reports normal fetal movement. Patient reports sharp pain in side that comes and goes       OBJECTIVE  Visit Vitals  /77   Wt 81.6 kg (179 lb 14.4 oz)   LMP 2024 (Exact Date)   BMI 29.97 kg/m²   OB Status Pregnant   Smoking Status Never   BSA 1.93 m²            ASSESSMENT & PLAN    Yadira Kamara is a 24 y.o.  at 36w0d here for the following concerns we addressed today:    Problem List Items Addressed This Visit       Polyhydramnios in third trimester ([facility])    Overview     ASTRID 26  Repeat in 4weeks         History of precipitous delivery    Overview     Precip 8lb baby in ambulance after SROM last birth         Fetal size consistent with dates during pregnancy in third trimester ([facility])    Overview     Ac97th%tile  GEU94ge%tile    36wga growth [  ]           36 weeks gestation of pregnancy ([facility]) - Primary    Overview     Desired provider in labor: [x] CNM  [] Physician  [x] Blood Products: [x] Yes, accepts [] No, needs counseling  [x] Initial BMI: 27.96   [x] Prenatal Labs:   [x] Cervical Cancer Screening up to date ** pending   [x] Rh status: RH+  [x] Genetic Screening:  ordered  [x] NT US: (11-13 wks)  [] Baby ASA (if indicated): n/a  [x] Pregnancy dated by: 12w4d US     [x] Anatomy US: (19-20 wks)  [] [x] 1hr GCT at 24-28wks:  [][] [x] Tdap (27-32 wks, may be given up to 36 wks if initial window missed): declines  [] [x] Flu Vaccine: declined    [x] Breastfeeding: needs pump  [x] Postpartum Birth control method: PPIUD Para CINDI!!!!!  [x] GBS at 36 - 37 wks: collected pending  [] 39 weeks discussion of IOL vs. Expectant management:  [] Mode of delivery ( anticipated ):          Relevant Orders    Follow Up 1 week    Group B Streptococcus (GBS) Prenatal Screen, Culture         RTC in 1 weeks      Julianna Mcnulty, YARY-GAVIM

## 2025-03-23 LAB — GP B STREP SPEC QL CULT: NORMAL

## 2025-03-24 LAB — GP B STREP SPEC QL CULT: ABNORMAL

## 2025-03-29 ENCOUNTER — HOSPITAL ENCOUNTER (OUTPATIENT)
Facility: HOSPITAL | Age: 25
Discharge: HOME | End: 2025-03-29
Attending: OBSTETRICS & GYNECOLOGY | Admitting: OBSTETRICS & GYNECOLOGY
Payer: MEDICAID

## 2025-03-29 VITALS
WEIGHT: 177.47 LBS | TEMPERATURE: 97.5 F | SYSTOLIC BLOOD PRESSURE: 116 MMHG | BODY MASS INDEX: 28.52 KG/M2 | RESPIRATION RATE: 16 BRPM | OXYGEN SATURATION: 99 % | HEIGHT: 66 IN | DIASTOLIC BLOOD PRESSURE: 76 MMHG | HEART RATE: 91 BPM

## 2025-03-29 PROCEDURE — 4500999001 HC ED NO CHARGE

## 2025-03-29 PROCEDURE — 99214 OFFICE O/P EST MOD 30 MIN: CPT

## 2025-03-29 RX ORDER — ONDANSETRON 4 MG/1
8 TABLET, FILM COATED ORAL ONCE
Status: DISCONTINUED | OUTPATIENT
Start: 2025-03-29 | End: 2025-03-29 | Stop reason: HOSPADM

## 2025-03-29 SDOH — HEALTH STABILITY: MENTAL HEALTH: WISH TO BE DEAD (PAST 1 MONTH): NO

## 2025-03-29 SDOH — HEALTH STABILITY: MENTAL HEALTH: HAVE YOU USED ANY SUBSTANCES (CANABIS, COCAINE, HEROIN, HALLUCINOGENS, INHALANTS, ETC.) IN THE PAST 12 MONTHS?: NO

## 2025-03-29 SDOH — SOCIAL STABILITY: SOCIAL INSECURITY: DOES ANYONE TRY TO KEEP YOU FROM HAVING/CONTACTING OTHER FRIENDS OR DOING THINGS OUTSIDE YOUR HOME?: NO

## 2025-03-29 SDOH — HEALTH STABILITY: MENTAL HEALTH: WERE YOU ABLE TO COMPLETE ALL THE BEHAVIORAL HEALTH SCREENINGS?: YES

## 2025-03-29 SDOH — SOCIAL STABILITY: SOCIAL INSECURITY: ARE YOU OR HAVE YOU BEEN THREATENED OR ABUSED PHYSICALLY, EMOTIONALLY, OR SEXUALLY BY ANYONE?: NO

## 2025-03-29 SDOH — ECONOMIC STABILITY: HOUSING INSECURITY: DO YOU FEEL UNSAFE GOING BACK TO THE PLACE WHERE YOU ARE LIVING?: NO

## 2025-03-29 SDOH — SOCIAL STABILITY: SOCIAL INSECURITY: ABUSE SCREEN: ADULT

## 2025-03-29 SDOH — SOCIAL STABILITY: SOCIAL INSECURITY: PHYSICAL ABUSE: DENIES

## 2025-03-29 SDOH — SOCIAL STABILITY: SOCIAL INSECURITY: DO YOU FEEL ANYONE HAS EXPLOITED OR TAKEN ADVANTAGE OF YOU FINANCIALLY OR OF YOUR PERSONAL PROPERTY?: NO

## 2025-03-29 SDOH — SOCIAL STABILITY: SOCIAL INSECURITY: HAVE YOU HAD ANY THOUGHTS OF HARMING ANYONE ELSE?: NO

## 2025-03-29 SDOH — SOCIAL STABILITY: SOCIAL INSECURITY: VERBAL ABUSE: DENIES

## 2025-03-29 SDOH — HEALTH STABILITY: MENTAL HEALTH: HAVE YOU USED ANY PRESCRIPTION DRUGS OTHER THAN PRESCRIBED IN THE PAST 12 MONTHS?: NO

## 2025-03-29 SDOH — SOCIAL STABILITY: SOCIAL INSECURITY: ARE THERE ANY APPARENT SIGNS OF INJURIES/BEHAVIORS THAT COULD BE RELATED TO ABUSE/NEGLECT?: NO

## 2025-03-29 SDOH — HEALTH STABILITY: MENTAL HEALTH: NON-SPECIFIC ACTIVE SUICIDAL THOUGHTS (PAST 1 MONTH): NO

## 2025-03-29 SDOH — SOCIAL STABILITY: SOCIAL INSECURITY: HAVE YOU HAD THOUGHTS OF HARMING ANYONE ELSE?: NO

## 2025-03-29 SDOH — SOCIAL STABILITY: SOCIAL INSECURITY: HAS ANYONE EVER THREATENED TO HURT YOUR FAMILY OR YOUR PETS?: NO

## 2025-03-29 ASSESSMENT — PAIN SCALES - GENERAL
PAINLEVEL_OUTOF10: 0 - NO PAIN
PAINLEVEL_OUTOF10: 0 - NO PAIN

## 2025-03-29 ASSESSMENT — LIFESTYLE VARIABLES
HOW MANY STANDARD DRINKS CONTAINING ALCOHOL DO YOU HAVE ON A TYPICAL DAY: PATIENT DOES NOT DRINK
AUDIT-C TOTAL SCORE: 0
AUDIT-C TOTAL SCORE: 0
HOW OFTEN DO YOU HAVE A DRINK CONTAINING ALCOHOL: NEVER
HOW OFTEN DO YOU HAVE 6 OR MORE DRINKS ON ONE OCCASION: NEVER
SKIP TO QUESTIONS 9-10: 1

## 2025-03-29 ASSESSMENT — PAIN - FUNCTIONAL ASSESSMENT: PAIN_FUNCTIONAL_ASSESSMENT: 0-10

## 2025-03-29 NOTE — ED TRIAGE NOTES
1 miscarriage. 37w1d presenting for contractions everyone 5 minutes intermittently since last night. No loss of fluid. Uncomplicated.

## 2025-03-29 NOTE — H&P
Triage H&P    Yadira Kamara is a 24 y.o. year old at 37w1d who presents to triage for worsening contracetions that started yesterday morning.   Chief Complaint   Patient presents with    Pregnancy Problem    Contractions        Assessment/Plan:  Non-labor Uterine contractions  -patient unchanged after 2.5 hours.  - Frequent contractions q2-5 min noted on toco, non-palpable abdominally.  - patient was offered an additional 2 hours of monitoring due to history of precipitous labor. Patient declined at this time. Given strict labor precautions.        IUP at 37w1d   - NST reactive  - Good fetal movement  - Continue routine prenatal care  - Next appt 4/4/25  - Precautions to return discussed    Maternal Well-being  - Vital signs stable and WNL  - All questions and concerns addressed     Plan and tracing reviewed with Dr. Reid .  Patient safe and stable for d/c home.    ALBERTINA Gardner      Medical Problems       Problem List       Poor weight gain of pregnancy ([facility])    Overview Addendum 2/7/2025  5:03 PM by ALBERTINA Salmeron     12/9-reviewed nutritional intervention  Will do growth US 28wga [x  ]    2lb weight gain so far during pregnancy  Fundal height measured wnl   Patient denies food insecurity ; she just isn't eating  Strongly encouraged patient to eat more, high protein foods              Labor and delivery indication for care or intervention ([facility])    36 weeks gestation of pregnancy ([facility])    Overview Addendum 3/21/2025  3:59 PM by ALBERTINA Salmeron     Desired provider in labor: [x] CNM  [] Physician  [x] Blood Products: [x] Yes, accepts [] No, needs counseling  [x] Initial BMI: 27.96   [x] Prenatal Labs:   [x] Cervical Cancer Screening up to date ** pending   [x] Rh status: RH+  [x] Genetic Screening:  ordered  [x] NT US: (11-13 wks)  [] Baby ASA (if indicated): n/a  [x] Pregnancy dated by: 12w4d US     [x] Anatomy US: (19-20 wks)  [] [x] 1hr GCT at  24-28wks:  [][] [x] Tdap (27-32 wks, may be given up to 36 wks if initial window missed): declines  [] [x] Flu Vaccine: declined    [x] Breastfeeding: needs pump  [x] Postpartum Birth control method: PPIUD Para CINDI!!!!!  [x] GBS at 36 - 37 wks: collected pending  [] 39 weeks discussion of IOL vs. Expectant management:  [] Mode of delivery ( anticipated ):          History of precipitous delivery    Overview Signed 1/10/2025  4:12 PM by ALBERTINA Salmeron     Precip 8lb baby in ambulance after SROM last birth         Polyhydramnios in third trimester ([facility])    Overview Addendum 3/29/2025  5:18 PM by Melissa L Zahorsky, APRN-CNM     2025 ASTRID 26  3/12/2025 ASTRID 19.3cm, however MVP is 8.0 = mild poly         Fetal size consistent with dates during pregnancy in third trimester ([facility])    Overview Addendum 3/21/2025  4:15 PM by ALBERTINA Salmeron     Ac97th%tile  OGF92mx%tile    36wga growth [  ]                  Subjective   Yadira Kamara is a 24 y.o. year old at 37w1d who presents to triage for contractions. She reports she began having contractions yesterday evening that were coming every 5 minutes but stopped overnight. Today around 1300 they started again. Unsure how often they are coming, reports she can talk through them but not walk through them. She reports she delivered in an ambulance with her last pregnancy after her water broke. She reports good fetal movement, denies leaking of fluid and vaginal bleeding.     OB History          5    Para   3    Term   3       0    AB   1    Living   3         SAB   1    IAB   0    Ectopic   0    Multiple   0    Live Births   3                  No past surgical history on file.     Social History     Socioeconomic History    Marital status:      Spouse name: New Ontiveros    Number of children: 3    Years of education: Not on file    Highest education level: Some college, no degree   Occupational History    Not on  file   Tobacco Use    Smoking status: Never    Smokeless tobacco: Never   Vaping Use    Vaping status: Never Used   Substance and Sexual Activity    Alcohol use: Not Currently    Drug use: Never    Sexual activity: Yes     Partners: Male   Other Topics Concern    Not on file   Social History Narrative    Not on file     Social Drivers of Health     Financial Resource Strain: Not on File (8/15/2023)    Received from [facility]    Financial Resource Strain     Financial Resource Strain: 0   Food Insecurity: Not on File (9/26/2024)    Received from [facility]    Food Insecurity     Food: 0   Transportation Needs: Not on File (8/15/2023)    Received from [facility]    Transportation Needs     Transportation: 0   Physical Activity: Not on File (8/15/2023)    Received from [facility]    Physical Activity     Physical Activity: 0   Stress: Not on File (8/15/2023)    Received from [facility]    Stress     Stress: 0   Social Connections: Not on File (9/14/2024)    Received from [facility]    Social Connections     Connectedness: 0   Intimate Partner Violence: Not on file        No Known Allergies     Medications Prior to Admission   Medication Sig Dispense Refill Last Dose/Taking    prenatal vit 14-iron fum-folic (CompleteNate) 29 mg iron- 1 mg tablet,chewable Chew 1 tablet once daily. 90 each 0 3/28/2025        Objective     Visit Vitals  /71   Pulse 96   Temp 36.6 °C (97.9 °F) (Temporal)   Resp 18        Physical Exam  Physical Exam  Constitutional:       General: She is not in acute distress.     Appearance: Normal appearance.   Cardiovascular:      Rate and Rhythm: Normal rate.   Pulmonary:      Effort: Pulmonary effort is normal.   Abdominal:      General: There is no distension.      Palpations: Abdomen is soft.   Genitourinary:     Comments: SVE: tight 3cm/50%effaced/-3 station/firm/posterior  Neurological:      Mental Status: She is alert.          NST   Baseline 130   +Accelerations  -decelerations  [place] q2-5  minutes   Reactive     Labs  Labs in chart were reviewed. Pt is GBS positive.

## 2025-03-30 ENCOUNTER — HOSPITAL ENCOUNTER (OUTPATIENT)
Facility: HOSPITAL | Age: 25
Discharge: HOME | End: 2025-03-31
Attending: OBSTETRICS & GYNECOLOGY | Admitting: OBSTETRICS & GYNECOLOGY
Payer: MEDICAID

## 2025-03-30 VITALS
DIASTOLIC BLOOD PRESSURE: 70 MMHG | OXYGEN SATURATION: 98 % | TEMPERATURE: 96.7 F | RESPIRATION RATE: 16 BRPM | SYSTOLIC BLOOD PRESSURE: 104 MMHG | HEART RATE: 99 BPM

## 2025-03-30 PROCEDURE — 4500999001 HC ED NO CHARGE

## 2025-03-30 RX ORDER — HYDRALAZINE HYDROCHLORIDE 20 MG/ML
5 INJECTION INTRAMUSCULAR; INTRAVENOUS ONCE AS NEEDED
Status: DISCONTINUED | OUTPATIENT
Start: 2025-03-30 | End: 2025-03-31 | Stop reason: HOSPADM

## 2025-03-30 RX ORDER — LABETALOL HYDROCHLORIDE 5 MG/ML
20 INJECTION, SOLUTION INTRAVENOUS ONCE AS NEEDED
Status: DISCONTINUED | OUTPATIENT
Start: 2025-03-30 | End: 2025-03-31 | Stop reason: HOSPADM

## 2025-03-30 RX ORDER — LIDOCAINE HYDROCHLORIDE 10 MG/ML
0.5 INJECTION, SOLUTION INFILTRATION; PERINEURAL ONCE AS NEEDED
Status: DISCONTINUED | OUTPATIENT
Start: 2025-03-30 | End: 2025-03-31 | Stop reason: HOSPADM

## 2025-03-30 RX ORDER — ONDANSETRON 4 MG/1
4 TABLET, FILM COATED ORAL EVERY 6 HOURS PRN
Status: DISCONTINUED | OUTPATIENT
Start: 2025-03-30 | End: 2025-03-31 | Stop reason: HOSPADM

## 2025-03-30 RX ORDER — ONDANSETRON HYDROCHLORIDE 2 MG/ML
4 INJECTION, SOLUTION INTRAVENOUS EVERY 6 HOURS PRN
Status: DISCONTINUED | OUTPATIENT
Start: 2025-03-30 | End: 2025-03-31 | Stop reason: HOSPADM

## 2025-03-30 SDOH — ECONOMIC STABILITY: HOUSING INSECURITY: DO YOU FEEL UNSAFE GOING BACK TO THE PLACE WHERE YOU ARE LIVING?: NO

## 2025-03-30 SDOH — HEALTH STABILITY: MENTAL HEALTH: WERE YOU ABLE TO COMPLETE ALL THE BEHAVIORAL HEALTH SCREENINGS?: YES

## 2025-03-30 SDOH — SOCIAL STABILITY: SOCIAL INSECURITY: PHYSICAL ABUSE: DENIES

## 2025-03-30 SDOH — SOCIAL STABILITY: SOCIAL INSECURITY: HAVE YOU HAD ANY THOUGHTS OF HARMING ANYONE ELSE?: NO

## 2025-03-30 SDOH — SOCIAL STABILITY: SOCIAL INSECURITY: DOES ANYONE TRY TO KEEP YOU FROM HAVING/CONTACTING OTHER FRIENDS OR DOING THINGS OUTSIDE YOUR HOME?: NO

## 2025-03-30 SDOH — SOCIAL STABILITY: SOCIAL INSECURITY: HAVE YOU HAD THOUGHTS OF HARMING ANYONE ELSE?: NO

## 2025-03-30 SDOH — SOCIAL STABILITY: SOCIAL INSECURITY: HAS ANYONE EVER THREATENED TO HURT YOUR FAMILY OR YOUR PETS?: NO

## 2025-03-30 SDOH — SOCIAL STABILITY: SOCIAL INSECURITY: ARE YOU OR HAVE YOU BEEN THREATENED OR ABUSED PHYSICALLY, EMOTIONALLY, OR SEXUALLY BY ANYONE?: NO

## 2025-03-30 SDOH — HEALTH STABILITY: MENTAL HEALTH: HAVE YOU USED ANY PRESCRIPTION DRUGS OTHER THAN PRESCRIBED IN THE PAST 12 MONTHS?: NO

## 2025-03-30 SDOH — SOCIAL STABILITY: SOCIAL INSECURITY: DO YOU FEEL ANYONE HAS EXPLOITED OR TAKEN ADVANTAGE OF YOU FINANCIALLY OR OF YOUR PERSONAL PROPERTY?: NO

## 2025-03-30 SDOH — SOCIAL STABILITY: SOCIAL INSECURITY: VERBAL ABUSE: DENIES

## 2025-03-30 SDOH — HEALTH STABILITY: MENTAL HEALTH: SUICIDAL BEHAVIOR (LIFETIME): NO

## 2025-03-30 SDOH — HEALTH STABILITY: MENTAL HEALTH: NON-SPECIFIC ACTIVE SUICIDAL THOUGHTS (PAST 1 MONTH): NO

## 2025-03-30 SDOH — SOCIAL STABILITY: SOCIAL INSECURITY: ABUSE SCREEN: ADULT

## 2025-03-30 SDOH — HEALTH STABILITY: MENTAL HEALTH: HAVE YOU USED ANY SUBSTANCES (CANABIS, COCAINE, HEROIN, HALLUCINOGENS, INHALANTS, ETC.) IN THE PAST 12 MONTHS?: NO

## 2025-03-30 SDOH — SOCIAL STABILITY: SOCIAL INSECURITY: ARE THERE ANY APPARENT SIGNS OF INJURIES/BEHAVIORS THAT COULD BE RELATED TO ABUSE/NEGLECT?: NO

## 2025-03-30 SDOH — HEALTH STABILITY: MENTAL HEALTH: WISH TO BE DEAD (PAST 1 MONTH): NO

## 2025-03-30 ASSESSMENT — PATIENT HEALTH QUESTIONNAIRE - PHQ9
2. FEELING DOWN, DEPRESSED OR HOPELESS: NOT AT ALL
1. LITTLE INTEREST OR PLEASURE IN DOING THINGS: NOT AT ALL
SUM OF ALL RESPONSES TO PHQ9 QUESTIONS 1 & 2: 0

## 2025-03-30 ASSESSMENT — LIFESTYLE VARIABLES
HOW MANY STANDARD DRINKS CONTAINING ALCOHOL DO YOU HAVE ON A TYPICAL DAY: PATIENT DOES NOT DRINK
HOW OFTEN DO YOU HAVE A DRINK CONTAINING ALCOHOL: NEVER
HOW OFTEN DO YOU HAVE 6 OR MORE DRINKS ON ONE OCCASION: NEVER
SKIP TO QUESTIONS 9-10: 1
AUDIT-C TOTAL SCORE: 0
AUDIT-C TOTAL SCORE: 0

## 2025-03-30 ASSESSMENT — PAIN SCALES - GENERAL: PAINLEVEL_OUTOF10: 7

## 2025-03-30 NOTE — DISCHARGE SUMMARY
Discharge Summary    Admission Date: 3/29/2025  Discharge Date: 3/29/2025    Discharge Diagnosis  Non-labor uterine contractions     Hospital Course  Patient presented to triage for worsening uterine contractions at 37 weeks. Patient made no cervical change, patient was offered additional monitoring due to history of precipitous labor, patient declined and opted to go home.     Pertinent Physical Exam At Time of Discharge  Constitutional:       General: She is not in acute distress.     Appearance: Normal appearance.   Cardiovascular:      Rate and Rhythm: Normal rate.   Pulmonary:      Effort: Pulmonary effort is normal.   Abdominal:      General: There is no distension.      Palpations: Abdomen is soft.   Genitourinary:     Comments: SVE: tight 3cm/50%effaced/-3 station/firm/posterior  Neurological:      Mental Status: She is alert.     Last Vitals:  Temp Pulse Resp BP MAP Pulse Ox   36.4 °C (97.5 °F) 91 16 116/76 90 99 %     Discharge Meds     Your medication list        CONTINUE taking these medications        Instructions Last Dose Given Next Dose Due   CompleteNate 29 mg iron- 1 mg tablet,chewable  Generic drug: prenatal vit 14-iron fum-folic      Chew 1 tablet once daily.                 Complications Requiring Follow-Up  Routine prenatal care.     Test Results Pending At Discharge  Pending Labs       No current pending labs.            Outpatient Follow-Up  Future Appointments   Date Time Provider Department Center   4/2/2025  3:15 PM [facility] [id number] OBGYNIMG ULTRASOUND 3 [id number] [facility] [place]   4/4/2025  4:15 PM ALBERTINA Salmeron [id number] [facility]       I spent 30 minutes in the professional and overall care of this patient.      ALBERTINA Gardner

## 2025-03-31 ENCOUNTER — HOSPITAL ENCOUNTER (EMERGENCY)
Facility: HOSPITAL | Age: 25
Discharge: LEFT BEFORE DISCHARGE | End: 2025-03-31
Payer: MEDICAID

## 2025-03-31 VITALS
RESPIRATION RATE: 18 BRPM | SYSTOLIC BLOOD PRESSURE: 109 MMHG | HEART RATE: 94 BPM | OXYGEN SATURATION: 99 % | DIASTOLIC BLOOD PRESSURE: 63 MMHG | TEMPERATURE: 96.8 F

## 2025-03-31 PROCEDURE — 99212 OFFICE O/P EST SF 10 MIN: CPT | Mod: 25

## 2025-03-31 PROCEDURE — 59025 FETAL NON-STRESS TEST: CPT

## 2025-03-31 PROCEDURE — 99215 OFFICE O/P EST HI 40 MIN: CPT

## 2025-03-31 NOTE — ED TRIAGE NOTES
Pt states that she is having contractions, she denies her water breaking or any loss of fluids. Pt states that she is 37 weeks pregnant, she is due on 25. Pt is a .

## 2025-03-31 NOTE — H&P
OB Triage H&P    Assessment/Plan    Yadira Kamara is a 24 y.o.  at 37w3d, DIANA: 2025, by Last Menstrual Period, who presents to triage with contractions, r/o labor .    Plan     - unchanged cervix on 2 hour recheck  -Fetal monitoring reassuring  -Good fetal movement  -Up to date on prenatal care  -Continue routine prenatal care    Dispo  -Patient appropriate for discharge home, agrees with plan. Morphine sleep offered and pt declined  -Return precautions discussed, comfort measures reviewed   -Follow up at next scheduled OB appointment or to triage sooner as needed    Discussed plan and reviewed with: N/A    Pregnancy Problems (from 24 to present)       Problem Noted Diagnosed Resolved    Polyhydramnios in third trimester ([facility]) 2025 by ALBERTINA Salmeron  No    Priority:  Medium       Overview Addendum 3/29/2025  5:18 PM by Melissa L Zahorsky, APRN-CNM     2025 ASTRID 26  3/12/2025 ASTRID 19.3cm, however MVP is 8.0 = mild poly         Fetal size consistent with dates during pregnancy in third trimester ([facility]) 2025 by ALBERTINA Salmeron  No    Priority:  Medium       Overview Addendum 3/29/2025  5:40 PM by Melissa L Zahorsky, APRN-CNM     Ac97th%tile  WNO69jz%tile    34w5d growth: EFW 2832 84%, AC 98%           36 weeks gestation of pregnancy ([facility]) 2024 by ALBERTINA Salmeron  No    Priority:  Medium       Overview Addendum 3/21/2025  3:59 PM by ALBERTINA Salmeron     Desired provider in labor: [x] CNM  [] Physician  [x] Blood Products: [x] Yes, accepts [] No, needs counseling  [x] Initial BMI: 27.96   [x] Prenatal Labs:   [x] Cervical Cancer Screening up to date ** pending   [x] Rh status: RH+  [x] Genetic Screening:  ordered  [x] NT US: (11-13 wks)  [] Baby ASA (if indicated): n/a  [x] Pregnancy dated by: 12w4d US     [x] Anatomy US: (19-20 wks)  [] [x] 1hr GCT at 24-28wks:  [][] [x] Tdap (27-32 wks, may be given up to 36  wks if initial window missed): declines  [] [x] Flu Vaccine: declined    [x] Breastfeeding: needs pump  [x] Postpartum Birth control method: PPIUD Para CINDI!!!!!  [x] GBS at 36 - 37 wks: collected pending  [] 39 weeks discussion of IOL vs. Expectant management:  [] Mode of delivery ( anticipated ):                  Subjective  Pt presents today with c/o contractions that have been presents most of the day but are now stronger. She reports   Good fetal movement.  Denies vaginal bleeding., Denies leaking of fluid.      Prenatal Provider Pricila BLACK     OB History    Para Term  AB Living   5 3 3 0 1 3   SAB IAB Ectopic Multiple Live Births   1 0 0 0 3      # Outcome Date GA Lbr Holger/2nd Weight Sex Type Anes PTL Lv   5 Current            4 Term 23 38w4d  3.64 kg M Vag-Spont None N BARB      Name: BERKLEY VALLEJO      Apgar1: 7  Apgar5: 7   3 Term 22 38w6d  3.544 kg M Vag-Spont None N BARB   2 SAB 2021           1 Term 10/26/20 40w0d  3.43 kg M Vag-Spont None N BARB       No past surgical history on file.    Social History     Tobacco Use    Smoking status: Never    Smokeless tobacco: Never   Substance Use Topics    Alcohol use: Not Currently       No Known Allergies    Medications Prior to Admission   Medication Sig Dispense Refill Last Dose/Taking    prenatal vit 14-iron fum-folic (CompleteNate) 29 mg iron- 1 mg tablet,chewable Chew 1 tablet once daily. 90 each 0      Objective     Last Vitals  Temp Pulse Resp BP MAP O2 Sat   36.4 °C (97.5 °F) 107 17 121/78 93 99 %     Blood Pressures         3/30/2025  2234             BP: 121/78             Physical Exam  General: NAD, mood appropriate  Cardiopulmonary: warm and well perfused, breathing comfortably on room air  Abdomen: Gravid, non-tender  Extremities: Symmetric  Speculum Exam: deferred  Cervix: 3 50 /-3   Pt unchanged on 2 hour recheck.     Fetal Monitoring  Baseline: 134 bpm, Variability: moderate,  Accelerations: present and  Decelerations: none  Uterine Activity: Irregular contractions every 3-6 min   Interpretation: Reactive    Bedside ultrasound: No    Labs in chart were reviewed.          Prenatal labs reviewed, remarkable for GBS pos .

## 2025-04-02 ENCOUNTER — HOSPITAL ENCOUNTER (OUTPATIENT)
Dept: RADIOLOGY | Facility: CLINIC | Age: 25
Discharge: HOME | End: 2025-04-02
Payer: MEDICAID

## 2025-04-02 DIAGNOSIS — Z32.01 PREGNANCY TEST POSITIVE (HHS-HCC): ICD-10-CM

## 2025-04-02 DIAGNOSIS — Z3A.30 30 WEEKS GESTATION OF PREGNANCY (HHS-HCC): ICD-10-CM

## 2025-04-02 DIAGNOSIS — Z3A.11 11 WEEKS GESTATION OF PREGNANCY (HHS-HCC): ICD-10-CM

## 2025-04-02 DIAGNOSIS — O26.843 UTERINE SIZE-DATE DISCREPANCY IN THIRD TRIMESTER (HHS-HCC): ICD-10-CM

## 2025-04-02 PROCEDURE — 76819 FETAL BIOPHYS PROFIL W/O NST: CPT

## 2025-04-02 PROCEDURE — 76816 OB US FOLLOW-UP PER FETUS: CPT

## 2025-04-02 RX ORDER — PRENATAL VIT 14/IRON FUM/FOLIC 29 MG-1 MG
1 TABLET,CHEWABLE ORAL DAILY
Qty: 90 EACH | Refills: 0 | Status: SHIPPED | OUTPATIENT
Start: 2025-04-02

## 2025-04-03 PROCEDURE — RXMED WILLOW AMBULATORY MEDICATION CHARGE

## 2025-04-04 ENCOUNTER — ROUTINE PRENATAL (OUTPATIENT)
Dept: OBSTETRICS AND GYNECOLOGY | Facility: CLINIC | Age: 25
End: 2025-04-04
Payer: MEDICAID

## 2025-04-04 ENCOUNTER — PHARMACY VISIT (OUTPATIENT)
Dept: PHARMACY | Facility: CLINIC | Age: 25
End: 2025-04-04
Payer: MEDICAID

## 2025-04-04 VITALS — WEIGHT: 183.3 LBS | BODY MASS INDEX: 29.59 KG/M2 | SYSTOLIC BLOOD PRESSURE: 94 MMHG | DIASTOLIC BLOOD PRESSURE: 68 MMHG

## 2025-04-04 DIAGNOSIS — Z3A.38 38 WEEKS GESTATION OF PREGNANCY (HHS-HCC): Primary | ICD-10-CM

## 2025-04-04 DIAGNOSIS — Z87.59 HISTORY OF PRECIPITOUS DELIVERY: ICD-10-CM

## 2025-04-04 DIAGNOSIS — O40.3XX1 POLYHYDRAMNIOS IN THIRD TRIMESTER COMPLICATION, FETUS 1 OF MULTIPLE GESTATION (HHS-HCC): ICD-10-CM

## 2025-04-04 DIAGNOSIS — Z34.93: ICD-10-CM

## 2025-04-04 PROCEDURE — 99214 OFFICE O/P EST MOD 30 MIN: CPT | Performed by: ADVANCED PRACTICE MIDWIFE

## 2025-04-04 PROCEDURE — 99214 OFFICE O/P EST MOD 30 MIN: CPT | Mod: TH | Performed by: ADVANCED PRACTICE MIDWIFE

## 2025-04-04 NOTE — PROGRESS NOTES
Ob Visit  25     SUBJECTIVE      HPI: Yadira Kamara is a 24 y.o.  at 38w0d here for RPNV.  She has no previously contractions, no bleeding, or no LOF. Reports normal fetal movement. Patient reports 3cm went to L&D last week.        OBJECTIVE  Visit Vitals  BP 94/68 Comment: 82   Wt 83.1 kg (183 lb 4.8 oz)   LMP 2024 (Exact Date)   BMI 29.59 kg/m²   OB Status Pregnant   Smoking Status Never   BSA 1.97 m²            ASSESSMENT & PLAN    Yadira Kamara is a 24 y.o.  at 38w0d here for the following concerns we addressed today:    Problem List Items Addressed This Visit       Polyhydramnios in third trimester ([facility])    Overview     2025 ASTRID 26  3/12/2025 ASTRID 19.3cm, however MVP is 8.0 = mild poly         History of precipitous delivery    Overview     Precip 8lb baby in ambulance after SROM last birth         Fetal size consistent with dates during pregnancy in third trimester ([facility])    Overview     Ac97th%tile  VEZ34ja%tile    34w5d growth: EFW 2832 84%, AC 98%    38wga -Increased interval fetal growth. The EFW is 3711 g or 91% and the AC is at the >99%  -The ASTRID is high-normal measuring 23.6 cm    Reviewed risks of LGA baby including   Reviewed etiology of shoulder dystocia  Occurrence in approximately 0.2-3% of vaginal deliveries  Discussed it is difficult to predict who may have a shoulder dystocia, however risk factors include:  diabetes in pregnancy (babies are more likely to have macrosomia; show a pattern of greater shoulder/chest/abdominal growth)  fetal macrosomia (3-13% of newborns weighing >4000g)  excessive weight gain in pregnancy (associated with increased rates of fetal macrosomia)  prior history of shoulder dystocia (10-12% recurrence rate)  Discussed maternal and  risks reviewed including: lacerations, PS separation,  brachial plexus injury, fractures to humerus or clavicle, HIE, rarely  death (1 in 25,000 deliveries)  Reviewed weight gain  (15); 1hr glucose (93)  Discussed options/risks/benefits of pLTCS vs. vaginal delivery  Patient acknowledges understanding             38 weeks gestation of pregnancy ([facility]) - Primary    Overview     Desired provider in labor: [x] CNM  [] Physician  [x] Blood Products: [x] Yes, accepts [] No, needs counseling  [x] Initial BMI: 27.96   [x] Prenatal Labs:   [x] Cervical Cancer Screening up to date ** pending   [x] Rh status: RH+  [x] Genetic Screening:  ordered  [x] NT US: (11-13 wks)  [] Baby ASA (if indicated): n/a  [x] Pregnancy dated by: 12w4d US     [x] Anatomy US: (19-20 wks)  [] [x] 1hr GCT at 24-28wks:  [][] [x] Tdap (27-32 wks, may be given up to 36 wks if initial window missed): declines  [] [x] Flu Vaccine: declined    [x] Breastfeeding: needs pump  [x] Postpartum Birth control method: PPIUD Para CINDI!!!!!  [x] GBS at 36 - 37 wks: collected pending  [x] 39 weeks discussion of IOL vs. Expectant management: declines IOL at 39wga at this time- understands risks of LGA baby at delivery- wishes to wait at this time  [x] Mode of delivery ( anticipated ):                RTC in 1 weeks      Julianna Mcnulty, YARY-SOFIA

## 2025-04-11 ENCOUNTER — ROUTINE PRENATAL (OUTPATIENT)
Dept: OBSTETRICS AND GYNECOLOGY | Facility: CLINIC | Age: 25
End: 2025-04-11
Payer: MEDICAID

## 2025-04-11 VITALS
WEIGHT: 183 LBS | BODY MASS INDEX: 29.54 KG/M2 | DIASTOLIC BLOOD PRESSURE: 75 MMHG | HEART RATE: 92 BPM | SYSTOLIC BLOOD PRESSURE: 109 MMHG

## 2025-04-11 DIAGNOSIS — Z87.59 HISTORY OF PRECIPITOUS DELIVERY: ICD-10-CM

## 2025-04-11 DIAGNOSIS — O40.3XX0 POLYHYDRAMNIOS IN THIRD TRIMESTER COMPLICATION, SINGLE OR UNSPECIFIED FETUS (HHS-HCC): ICD-10-CM

## 2025-04-11 DIAGNOSIS — Z34.93: ICD-10-CM

## 2025-04-11 DIAGNOSIS — Z3A.39 39 WEEKS GESTATION OF PREGNANCY (HHS-HCC): Primary | ICD-10-CM

## 2025-04-11 PROCEDURE — 99213 OFFICE O/P EST LOW 20 MIN: CPT | Performed by: ADVANCED PRACTICE MIDWIFE

## 2025-04-11 PROCEDURE — 99213 OFFICE O/P EST LOW 20 MIN: CPT | Mod: TH | Performed by: ADVANCED PRACTICE MIDWIFE

## 2025-04-11 ASSESSMENT — ENCOUNTER SYMPTOMS
ALLERGIC/IMMUNOLOGIC NEGATIVE: 0
PSYCHIATRIC NEGATIVE: 0
MUSCULOSKELETAL NEGATIVE: 0
NEUROLOGICAL NEGATIVE: 0
CONSTITUTIONAL NEGATIVE: 0
HEMATOLOGIC/LYMPHATIC NEGATIVE: 0
RESPIRATORY NEGATIVE: 0
EYES NEGATIVE: 0
ENDOCRINE NEGATIVE: 0
GASTROINTESTINAL NEGATIVE: 0
CARDIOVASCULAR NEGATIVE: 0

## 2025-04-11 ASSESSMENT — PATIENT HEALTH QUESTIONNAIRE - PHQ9
2. FEELING DOWN, DEPRESSED OR HOPELESS: NOT AT ALL
1. LITTLE INTEREST OR PLEASURE IN DOING THINGS: NOT AT ALL
SUM OF ALL RESPONSES TO PHQ9 QUESTIONS 1 AND 2: 0

## 2025-04-11 ASSESSMENT — PAIN SCALES - GENERAL: PAINLEVEL_OUTOF10: 0 - NO PAIN

## 2025-04-11 ASSESSMENT — PAIN - FUNCTIONAL ASSESSMENT: PAIN_FUNCTIONAL_ASSESSMENT: 0-10

## 2025-04-11 NOTE — PROGRESS NOTES
Ob Visit  25     SUBJECTIVE      HPI: Yadira Kamara is a 24 y.o.  at 39w0d here for RPNV.  She has irregular contractions, no bleeding, or no LOF. Reports normal fetal movement. Patient reports abdominal pain- after eating but no immediate signs of labor.        OBJECTIVE  Visit Vitals  /75   Pulse 92   Wt 83 kg (183 lb)   LMP 2024 (Exact Date)   BMI 29.54 kg/m²   OB Status Pregnant   Smoking Status Never   BSA 1.97 m²            ASSESSMENT & PLAN    Yadira Kamara is a 24 y.o.  at 39w0d here for the following concerns we addressed today:    Problem List Items Addressed This Visit       Polyhydramnios in third trimester ([facility])    Overview     2025 ASTRID 26  3/12/2025 ASTRID 19.3cm, however MVP is 8.0 = mild poly         History of precipitous delivery    Overview     Precip 8lb baby in ambulance after SROM last birth         Fetal size consistent with dates during pregnancy in third trimester ([facility])    Overview     Ac97th%tile  OSQ41fx%tile    34w5d growth: EFW 2832 84%, AC 98%    38wga -Increased interval fetal growth. The EFW is 3711 g or 91% and the AC is at the >99%  -The ASTRID is high-normal measuring 23.6 cm    Reviewed risks of LGA baby including   Reviewed etiology of shoulder dystocia  Occurrence in approximately 0.2-3% of vaginal deliveries  Discussed it is difficult to predict who may have a shoulder dystocia, however risk factors include:  diabetes in pregnancy (babies are more likely to have macrosomia; show a pattern of greater shoulder/chest/abdominal growth)  fetal macrosomia (3-13% of newborns weighing >4000g)  excessive weight gain in pregnancy (associated with increased rates of fetal macrosomia)  prior history of shoulder dystocia (10-12% recurrence rate)  Discussed maternal and  risks reviewed including: lacerations, PS separation,  brachial plexus injury, fractures to humerus or clavicle, HIE, rarely  death (1 in 25,000  deliveries)  Reviewed weight gain (15); 1hr glucose (93)  Discussed options/risks/benefits of pLTCS vs. vaginal delivery  Patient acknowledges understanding             39 weeks gestation of pregnancy ([facility]) - Primary    Overview     Desired provider in labor: [x] CNM  [] Physician  [x] Blood Products: [x] Yes, accepts [] No, needs counseling  [x] Initial BMI: 27.96   [x] Prenatal Labs:   [x] Cervical Cancer Screening up to date ** pending   [x] Rh status: RH+  [x] Genetic Screening:  ordered  [x] NT US: (11-13 wks)  [] Baby ASA (if indicated): n/a  [x] Pregnancy dated by: 12w4d US     [x] Anatomy US: (19-20 wks)  [] [x] 1hr GCT at 24-28wks:  [][] [x] Tdap (27-32 wks, may be given up to 36 wks if initial window missed): declines  [] [x] Flu Vaccine: declined    [x] Breastfeeding: needs pump  [x] Postpartum Birth control method: PPIUD Para CINDI!!!!!  [x] GBS at 36 - 37 wks: collected pending  [x] 39 weeks discussion of IOL vs. Expectant management: declines IOL at 39wga at this time- understands risks of LGA baby at delivery- wishes to wait at this time  [x] Mode of delivery ( anticipated ):           Relevant Orders    Follow Up 1 week       Declines IOL until after 40w0d  RTC in 1  weeks      YARY Salmeron-SOFIA

## 2025-04-16 ENCOUNTER — APPOINTMENT (OUTPATIENT)
Dept: OBSTETRICS AND GYNECOLOGY | Facility: CLINIC | Age: 25
End: 2025-04-16
Payer: MEDICAID

## 2025-04-16 ENCOUNTER — HOSPITAL ENCOUNTER (INPATIENT)
Facility: HOSPITAL | Age: 25
LOS: 2 days | Discharge: HOME | End: 2025-04-18
Attending: STUDENT IN AN ORGANIZED HEALTH CARE EDUCATION/TRAINING PROGRAM
Payer: MEDICAID

## 2025-04-16 ENCOUNTER — ANESTHESIA (OUTPATIENT)
Dept: OBSTETRICS AND GYNECOLOGY | Facility: HOSPITAL | Age: 25
End: 2025-04-16
Payer: MEDICAID

## 2025-04-16 ENCOUNTER — ANESTHESIA EVENT (OUTPATIENT)
Dept: OBSTETRICS AND GYNECOLOGY | Facility: HOSPITAL | Age: 25
End: 2025-04-16
Payer: MEDICAID

## 2025-04-16 DIAGNOSIS — Z3A.11 11 WEEKS GESTATION OF PREGNANCY (HHS-HCC): ICD-10-CM

## 2025-04-16 DIAGNOSIS — Z32.01 PREGNANCY TEST POSITIVE (HHS-HCC): ICD-10-CM

## 2025-04-16 PROBLEM — Z34.90 ENCOUNTER FOR PLANNED INDUCTION OF LABOR: Status: ACTIVE | Noted: 2025-04-16

## 2025-04-16 LAB
ABO GROUP (TYPE) IN BLOOD: NORMAL
ANTIBODY SCREEN: NORMAL
ERYTHROCYTE [DISTWIDTH] IN BLOOD BY AUTOMATED COUNT: 13.7 % (ref 11.5–14.5)
HCT VFR BLD AUTO: 42.1 % (ref 36–46)
HGB BLD-MCNC: 14.5 G/DL (ref 12–16)
MCH RBC QN AUTO: 30.1 PG (ref 26–34)
MCHC RBC AUTO-ENTMCNC: 34.4 G/DL (ref 32–36)
MCV RBC AUTO: 87 FL (ref 80–100)
NRBC BLD-RTO: 0 /100 WBCS (ref 0–0)
PLATELET # BLD AUTO: 293 X10*3/UL (ref 150–450)
RBC # BLD AUTO: 4.82 X10*6/UL (ref 4–5.2)
RH FACTOR (ANTIGEN D): NORMAL
TREPONEMA PALLIDUM IGG+IGM AB [PRESENCE] IN SERUM OR PLASMA BY IMMUNOASSAY: NONREACTIVE
WBC # BLD AUTO: 9.1 X10*3/UL (ref 4.4–11.3)

## 2025-04-16 PROCEDURE — 85027 COMPLETE CBC AUTOMATED: CPT

## 2025-04-16 PROCEDURE — 59409 OBSTETRICAL CARE: CPT | Performed by: ADVANCED PRACTICE MIDWIFE

## 2025-04-16 PROCEDURE — 7210000002 HC LABOR PER HOUR

## 2025-04-16 PROCEDURE — 3E033VJ INTRODUCTION OF OTHER HORMONE INTO PERIPHERAL VEIN, PERCUTANEOUS APPROACH: ICD-10-PCS

## 2025-04-16 PROCEDURE — 99214 OFFICE O/P EST MOD 30 MIN: CPT

## 2025-04-16 PROCEDURE — 2720000007 HC OR 272 NO HCPCS

## 2025-04-16 PROCEDURE — 36415 COLL VENOUS BLD VENIPUNCTURE: CPT

## 2025-04-16 PROCEDURE — 1120000001 HC OB PRIVATE ROOM DAILY

## 2025-04-16 PROCEDURE — 86901 BLOOD TYPING SEROLOGIC RH(D): CPT

## 2025-04-16 PROCEDURE — 59050 FETAL MONITOR W/REPORT: CPT

## 2025-04-16 PROCEDURE — 2500000004 HC RX 250 GENERAL PHARMACY W/ HCPCS (ALT 636 FOR OP/ED): Mod: SE

## 2025-04-16 PROCEDURE — 10907ZC DRAINAGE OF AMNIOTIC FLUID, THERAPEUTIC FROM PRODUCTS OF CONCEPTION, VIA NATURAL OR ARTIFICIAL OPENING: ICD-10-PCS | Performed by: ADVANCED PRACTICE MIDWIFE

## 2025-04-16 PROCEDURE — 4500999001 HC ED NO CHARGE

## 2025-04-16 PROCEDURE — 86780 TREPONEMA PALLIDUM: CPT

## 2025-04-16 RX ORDER — FENTANYL/ROPIVACAINE/NS/PF 2MCG/ML-.2
0-25 PLASTIC BAG, INJECTION (ML) INJECTION CONTINUOUS
Refills: 0 | Status: DISCONTINUED | OUTPATIENT
Start: 2025-04-16 | End: 2025-04-17

## 2025-04-16 RX ORDER — OXYTOCIN/0.9 % SODIUM CHLORIDE 30/500 ML
60 PLASTIC BAG, INJECTION (ML) INTRAVENOUS ONCE AS NEEDED
Status: DISCONTINUED | OUTPATIENT
Start: 2025-04-16 | End: 2025-04-17 | Stop reason: HOSPADM

## 2025-04-16 RX ORDER — LOPERAMIDE HYDROCHLORIDE 2 MG/1
4 CAPSULE ORAL EVERY 2 HOUR PRN
Status: DISCONTINUED | OUTPATIENT
Start: 2025-04-16 | End: 2025-04-17 | Stop reason: HOSPADM

## 2025-04-16 RX ORDER — ONDANSETRON HYDROCHLORIDE 2 MG/ML
4 INJECTION, SOLUTION INTRAVENOUS EVERY 6 HOURS PRN
Status: DISCONTINUED | OUTPATIENT
Start: 2025-04-16 | End: 2025-04-17

## 2025-04-16 RX ORDER — TRANEXAMIC ACID 100 MG/ML
1000 INJECTION, SOLUTION INTRAVENOUS ONCE AS NEEDED
Status: DISCONTINUED | OUTPATIENT
Start: 2025-04-16 | End: 2025-04-17 | Stop reason: HOSPADM

## 2025-04-16 RX ORDER — OXYTOCIN/0.9 % SODIUM CHLORIDE 30/500 ML
2-30 PLASTIC BAG, INJECTION (ML) INTRAVENOUS CONTINUOUS
Status: DISCONTINUED | OUTPATIENT
Start: 2025-04-16 | End: 2025-04-17

## 2025-04-16 RX ORDER — OXYTOCIN/0.9 % SODIUM CHLORIDE 30/500 ML
60 PLASTIC BAG, INJECTION (ML) INTRAVENOUS ONCE AS NEEDED
Status: COMPLETED | OUTPATIENT
Start: 2025-04-16 | End: 2025-04-16

## 2025-04-16 RX ORDER — LABETALOL HYDROCHLORIDE 5 MG/ML
20 INJECTION, SOLUTION INTRAVENOUS ONCE AS NEEDED
Status: DISCONTINUED | OUTPATIENT
Start: 2025-04-16 | End: 2025-04-17 | Stop reason: HOSPADM

## 2025-04-16 RX ORDER — HYDRALAZINE HYDROCHLORIDE 20 MG/ML
5 INJECTION INTRAMUSCULAR; INTRAVENOUS ONCE AS NEEDED
Status: DISCONTINUED | OUTPATIENT
Start: 2025-04-16 | End: 2025-04-17 | Stop reason: HOSPADM

## 2025-04-16 RX ORDER — OXYTOCIN 10 [USP'U]/ML
10 INJECTION, SOLUTION INTRAMUSCULAR; INTRAVENOUS ONCE AS NEEDED
Status: DISCONTINUED | OUTPATIENT
Start: 2025-04-16 | End: 2025-04-17 | Stop reason: HOSPADM

## 2025-04-16 RX ORDER — MISOPROSTOL 200 UG/1
800 TABLET ORAL ONCE AS NEEDED
Status: DISCONTINUED | OUTPATIENT
Start: 2025-04-16 | End: 2025-04-17 | Stop reason: HOSPADM

## 2025-04-16 RX ORDER — SODIUM CHLORIDE, SODIUM LACTATE, POTASSIUM CHLORIDE, CALCIUM CHLORIDE 600; 310; 30; 20 MG/100ML; MG/100ML; MG/100ML; MG/100ML
125 INJECTION, SOLUTION INTRAVENOUS CONTINUOUS
Status: DISCONTINUED | OUTPATIENT
Start: 2025-04-16 | End: 2025-04-17

## 2025-04-16 RX ORDER — METHYLERGONOVINE MALEATE 0.2 MG/ML
0.2 INJECTION INTRAVENOUS ONCE AS NEEDED
Status: DISCONTINUED | OUTPATIENT
Start: 2025-04-16 | End: 2025-04-17 | Stop reason: HOSPADM

## 2025-04-16 RX ORDER — ONDANSETRON 4 MG/1
4 TABLET, FILM COATED ORAL EVERY 6 HOURS PRN
Status: DISCONTINUED | OUTPATIENT
Start: 2025-04-16 | End: 2025-04-17

## 2025-04-16 RX ORDER — LIDOCAINE HYDROCHLORIDE 10 MG/ML
30 INJECTION, SOLUTION INFILTRATION; PERINEURAL ONCE AS NEEDED
Status: DISCONTINUED | OUTPATIENT
Start: 2025-04-16 | End: 2025-04-17 | Stop reason: HOSPADM

## 2025-04-16 RX ORDER — CARBOPROST TROMETHAMINE 250 UG/ML
250 INJECTION, SOLUTION INTRAMUSCULAR ONCE AS NEEDED
Status: DISCONTINUED | OUTPATIENT
Start: 2025-04-16 | End: 2025-04-17 | Stop reason: HOSPADM

## 2025-04-16 RX ORDER — TERBUTALINE SULFATE 1 MG/ML
0.25 INJECTION SUBCUTANEOUS ONCE AS NEEDED
Status: DISCONTINUED | OUTPATIENT
Start: 2025-04-16 | End: 2025-04-17 | Stop reason: HOSPADM

## 2025-04-16 RX ORDER — PENICILLIN G 3000000 [IU]/50ML
3 INJECTION, SOLUTION INTRAVENOUS EVERY 4 HOURS
Status: DISCONTINUED | OUTPATIENT
Start: 2025-04-16 | End: 2025-04-17

## 2025-04-16 RX ADMIN — SODIUM CHLORIDE, SODIUM LACTATE, POTASSIUM CHLORIDE, AND CALCIUM CHLORIDE 125 ML/HR: .6; .31; .03; .02 INJECTION, SOLUTION INTRAVENOUS at 16:25

## 2025-04-16 RX ADMIN — Medication 60 MILLI-UNITS/MIN: at 23:46

## 2025-04-16 RX ADMIN — Medication 2 MILLI-UNITS/MIN: at 18:22

## 2025-04-16 RX ADMIN — PENICILLIN G POTASSIUM 5 MILLION UNITS: 5000000 INJECTION, POWDER, FOR SOLUTION INTRAMUSCULAR; INTRAVENOUS at 16:23

## 2025-04-16 RX ADMIN — SODIUM CHLORIDE, SODIUM LACTATE, POTASSIUM CHLORIDE, AND CALCIUM CHLORIDE 500 ML: .6; .31; .03; .02 INJECTION, SOLUTION INTRAVENOUS at 17:12

## 2025-04-16 RX ADMIN — PENICILLIN G 3 MILLION UNITS: 3000000 INJECTION, SOLUTION INTRAVENOUS at 20:30

## 2025-04-16 SDOH — HEALTH STABILITY: MENTAL HEALTH: WISH TO BE DEAD (PAST 1 MONTH): NO

## 2025-04-16 SDOH — HEALTH STABILITY: MENTAL HEALTH: NON-SPECIFIC ACTIVE SUICIDAL THOUGHTS (PAST 1 MONTH): NO

## 2025-04-16 SDOH — SOCIAL STABILITY: SOCIAL INSECURITY: ARE YOU OR HAVE YOU BEEN THREATENED OR ABUSED PHYSICALLY, EMOTIONALLY, OR SEXUALLY BY ANYONE?: NO

## 2025-04-16 SDOH — HEALTH STABILITY: MENTAL HEALTH: SUICIDAL BEHAVIOR (LIFETIME): NO

## 2025-04-16 SDOH — HEALTH STABILITY: MENTAL HEALTH: HAVE YOU USED ANY PRESCRIPTION DRUGS OTHER THAN PRESCRIBED IN THE PAST 12 MONTHS?: NO

## 2025-04-16 SDOH — SOCIAL STABILITY: SOCIAL INSECURITY: DO YOU FEEL ANYONE HAS EXPLOITED OR TAKEN ADVANTAGE OF YOU FINANCIALLY OR OF YOUR PERSONAL PROPERTY?: NO

## 2025-04-16 SDOH — HEALTH STABILITY: MENTAL HEALTH: HAVE YOU USED ANY SUBSTANCES (CANABIS, COCAINE, HEROIN, HALLUCINOGENS, INHALANTS, ETC.) IN THE PAST 12 MONTHS?: NO

## 2025-04-16 SDOH — HEALTH STABILITY: MENTAL HEALTH: CURRENT SMOKER: 0

## 2025-04-16 SDOH — SOCIAL STABILITY: SOCIAL INSECURITY: VERBAL ABUSE: DENIES

## 2025-04-16 SDOH — SOCIAL STABILITY: SOCIAL INSECURITY: ARE THERE ANY APPARENT SIGNS OF INJURIES/BEHAVIORS THAT COULD BE RELATED TO ABUSE/NEGLECT?: NO

## 2025-04-16 SDOH — SOCIAL STABILITY: SOCIAL INSECURITY: ABUSE SCREEN: ADULT

## 2025-04-16 SDOH — SOCIAL STABILITY: SOCIAL INSECURITY: DOES ANYONE TRY TO KEEP YOU FROM HAVING/CONTACTING OTHER FRIENDS OR DOING THINGS OUTSIDE YOUR HOME?: NO

## 2025-04-16 SDOH — SOCIAL STABILITY: SOCIAL INSECURITY: PHYSICAL ABUSE: DENIES

## 2025-04-16 SDOH — HEALTH STABILITY: MENTAL HEALTH: WERE YOU ABLE TO COMPLETE ALL THE BEHAVIORAL HEALTH SCREENINGS?: YES

## 2025-04-16 SDOH — SOCIAL STABILITY: SOCIAL INSECURITY: HAS ANYONE EVER THREATENED TO HURT YOUR FAMILY OR YOUR PETS?: NO

## 2025-04-16 SDOH — SOCIAL STABILITY: SOCIAL INSECURITY: HAVE YOU HAD THOUGHTS OF HARMING ANYONE ELSE?: NO

## 2025-04-16 SDOH — ECONOMIC STABILITY: HOUSING INSECURITY: DO YOU FEEL UNSAFE GOING BACK TO THE PLACE WHERE YOU ARE LIVING?: NO

## 2025-04-16 ASSESSMENT — COLUMBIA-SUICIDE SEVERITY RATING SCALE - C-SSRS
2. HAVE YOU ACTUALLY HAD ANY THOUGHTS OF KILLING YOURSELF?: NO
1. IN THE PAST MONTH, HAVE YOU WISHED YOU WERE DEAD OR WISHED YOU COULD GO TO SLEEP AND NOT WAKE UP?: NO

## 2025-04-16 ASSESSMENT — LIFESTYLE VARIABLES
HOW OFTEN DO YOU HAVE A DRINK CONTAINING ALCOHOL: NEVER
AUDIT-C TOTAL SCORE: 0
SKIP TO QUESTIONS 9-10: 1
AUDIT-C TOTAL SCORE: 0
HOW MANY STANDARD DRINKS CONTAINING ALCOHOL DO YOU HAVE ON A TYPICAL DAY: PATIENT DOES NOT DRINK
HOW OFTEN DO YOU HAVE 6 OR MORE DRINKS ON ONE OCCASION: NEVER

## 2025-04-16 ASSESSMENT — PAIN SCALES - GENERAL
PAINLEVEL_OUTOF10: 0 - NO PAIN

## 2025-04-16 ASSESSMENT — ACTIVITIES OF DAILY LIVING (ADL): LACK_OF_TRANSPORTATION: NO

## 2025-04-16 NOTE — PROGRESS NOTES
OB Admission H&P    ASSESSMENT & PLAN: Yadira Kamara is a 24 y.o.  at 39w5d by LMP c/w 12w4d US, who presents with contractions, desires IOL/augmentation.    Mild Polyhydramnios  -37w5d US: MVP 8.4 cm, ASTRID 23.6 cm    LGA fetus  -37w5d growth: EFW 3711g 91%, AC >99%  -Extrapolates to ~4211g, pelvis proven to 3544g  -Normal 1 hr GCT  -Previously counseled on shoulder dystocia risk, desires     H/o precipitous delivery  -Delivered in ambulance with G4 after SROM at 38w4d    Dehydration  -Udip with 4+ ketones, SG >1.030  -LR 500ml IV fluid bolus, then maintenance at 125ml/hr  -Encourage PO intake    Labor augmentation/induction  -Likely not in labor; toco with irregular and infrequent ctx most likely d/t dehydration.  -Offered 2 hour recheck, patient desires to stay for IOL/augmentation.  -Admit to L&D, consented  -T&S, CBC, and Syphilis  -Epidural at patient request  -Recheck as clinically indicated by maternal or fetal status  -Plan to initiate induction with pitocin per guidelines when appropriate    Fetal Status  -NST reactive, reassuring   -Presentation cephalic based on Ultrasound, Cervical Exam, and Leopold  -EFW 8lbs by Leopold's  -GBS positive, for PCN ppx    -Postpartum Contraception Plan: post-placental paragard IUD, may consider interval    Subjective   Yadira is a 24 y.o.  at 39w5d by LMP, c/w 12w4d US, who presents for contractions that began yesterday evening that she reports as painful. Ctx felt in her lower abdomen and into her left thigh. Overnight they subsided and returned today more mild in nature, about every 10 mins. She denies leaking of fluid and vaginal bleeding. Reports normal fetal movement.     Yadira states she wants to be done with her pregnancy and is requesting to stay for labor augmentation/induction if she is not in labor. Here with her partner.     Prenatal Provider SOFIA Mcnulty    Pregnancy Problems (from 24 to present)       Problem Noted Diagnosed  Resolved    Encounter for planned induction of labor 2025 by Melissa L Zahorsky, APRN-CNM  No    Priority:  Medium       Polyhydramnios in third trimester ([facility]) 2025 by ALBERTINA Salmeron  No    Priority:  Medium       Overview Addendum 3/29/2025  5:18 PM by Melissa L Zahorsky, APRN-CNM   2025 ASTRID 26  3/12/2025 ASTRID 19.3cm, however MVP is 8.0 = mild poly         Fetal size consistent with dates during pregnancy in third trimester ([facility]) 2025 by ALBERTINA Salmeron  No    Priority:  Medium       Overview Addendum 2025  4:47 PM by ALBERTINA Salmeron   Ac97th%tile  GIE74ku%tile    34w5d growth: EFW 2832 84%, AC 98%    38wga -Increased interval fetal growth. The EFW is 3711 g or 91% and the AC is at the >99%  -The ASTRID is high-normal measuring 23.6 cm    Reviewed risks of LGA baby including   Reviewed etiology of shoulder dystocia  Occurrence in approximately 0.2-3% of vaginal deliveries  Discussed it is difficult to predict who may have a shoulder dystocia, however risk factors include:  diabetes in pregnancy (babies are more likely to have macrosomia; show a pattern of greater shoulder/chest/abdominal growth)  fetal macrosomia (3-13% of newborns weighing >4000g)  excessive weight gain in pregnancy (associated with increased rates of fetal macrosomia)  prior history of shoulder dystocia (10-12% recurrence rate)  Discussed maternal and  risks reviewed including: lacerations, PS separation,  brachial plexus injury, fractures to humerus or clavicle, HIE, rarely  death (1 in 25,000 deliveries)  Reviewed weight gain (15); 1hr glucose (93)  Discussed options/risks/benefits of pLTCS vs. vaginal delivery  Patient acknowledges understanding             39 weeks gestation of pregnancy ([facility]) 2024 by ALBERTINA Salmeron  No    Priority:  Medium       Overview Addendum 2025  4:48 PM by YARY Salmeron-SOFIA    Desired provider in labor: [x] CNM  [] Physician  [x] Blood Products: [x] Yes, accepts [] No, needs counseling  [x] Initial BMI: 27.96   [x] Prenatal Labs:   [x] Cervical Cancer Screening up to date ** pending   [x] Rh status: RH+  [x] Genetic Screening:  ordered  [x] NT US: (11-13 wks)  [] Baby ASA (if indicated): n/a  [x] Pregnancy dated by: 12w4d US     [x] Anatomy US: (19-20 wks)  [] [x] 1hr GCT at 24-28wks:  [][] [x] Tdap (27-32 wks, may be given up to 36 wks if initial window missed): declines  [] [x] Flu Vaccine: declined    [x] Breastfeeding: needs pump  [x] Postpartum Birth control method: PPIUD Para CINDI!!!!!  [x] GBS at 36 - 37 wks: collected pending  [x] 39 weeks discussion of IOL vs. Expectant management: declines IOL at 39wga at this time- understands risks of LGA baby at delivery- wishes to wait at this time  [x] Mode of delivery ( anticipated ):                   OB History    Para Term  AB Living   5 3 3 0 1 3   SAB IAB Ectopic Multiple Live Births   1 0 0 0 3      # Outcome Date GA Lbr Holger/2nd Weight Sex Type Anes PTL Lv   5 Current            4 Term 23 38w4d  3.64 kg M Vag-Spont None N BARB      Name: BERKLEY VALLEJO      Apgar1: 7  Apgar5: 7   3 Term 22 38w6d  3.544 kg M Vag-Spont None N BARB   2 SAB 2021           1 Term 10/26/20 40w0d  3.43 kg M Vag-Spont None N BARB       Surgical History[1]    Social History     Tobacco Use    Smoking status: Never    Smokeless tobacco: Never   Substance Use Topics    Alcohol use: Not Currently       Allergies[2]    Prescriptions Prior to Admission[3]  Objective     Last Vitals  Temp Pulse Resp BP MAP O2 Sat   36.3 °C (97.3 °F) 89 16 128/79 96 100 %         Physical Exam  General: NAD, mood appropriate  Cardiopulmonary: warm and well perfused, breathing comfortably on room air  Abdomen: Gravid, non-tender  Extremities: Symmetric  Speculum Exam: deferred  Cervix:  /-2, previously 3/40/-4, 5 days ago in office      Fetal Monitoring  Baseline: 135 bpm, Variability: Moderate, Accelerations: Present and Decelerations: None  Uterine Activity: Irregular contractions, q8-14 minutes  Interpretation: Category 1    Bedside Ultrasound: YES, Findings: cephalic      Prenatal labs reviewed, remarkable for GBS positive                  [1] No past surgical history on file.  [2] No Known Allergies  [3]   Medications Prior to Admission   Medication Sig Dispense Refill Last Dose/Taking    prenatal vit 14-iron fum-folic (CompleteNate) 29 mg iron- 1 mg tablet,chewable Chew 1 tablet once daily. 90 each 0 Past Month

## 2025-04-16 NOTE — CARE PLAN
The patient's goals for the shift include patient will have adequate pain control throughout shift.    The clinical goals for the shift include patient will have an uncomplicated vaginal delivery.    Patient continues with IOL.

## 2025-04-16 NOTE — SIGNIFICANT EVENT
In to check on [place], currently sitting in [employer] ordering dinner. Reports contractions feel the same as they have, mild in nature. Is amenable to SVE recheck to determine next steps.    SVE unchanged, 4/50/-2  , moderate variability, +accels, intermittent variable decels, overall reassuring  [place] q8-20 mins    rrIOL  GBS pos  LGA fetus    Given unchanged exam, discussed option for AROM and/or initiating pitocin. Patient requests to start pitocin and defer AROM for now.   Planning NCB  PCN for GBS ppx  Start pitocin per protocol, discussed clear liquids when pit at 10mu  Continue assessment of maternal and fetal wellbeing  Recheck as clinically indicated by maternal or fetal status  Patient status and plan of care reviewed with Dr. Marra Melissa L Zahorsky, YARY-SOFIA

## 2025-04-16 NOTE — ANESTHESIA PREPROCEDURE EVALUATION
Patient: Yadira Kamara    Evaluation Method: In-person visit    Procedure Information    Date: 25  Procedure: Labor Consult         Relevant Problems   Anesthesia (within normal limits)      Cardiac (within normal limits)      Pulmonary (within normal limits)      Neuro (within normal limits)      GI (within normal limits)      /Renal (within normal limits)      Liver (within normal limits)      Endocrine (within normal limits)      Hematology (within normal limits)      Musculoskeletal (within normal limits)      HEENT (within normal limits)      ID (within normal limits)      Skin (within normal limits)      GYN   (+) 39 weeks gestation of pregnancy ([facility])       Clinical information reviewed:    Allergies  Meds               NPO Detail:  Eating at time of interview.     OB/Gyn Evaluation    Present Pregnancy    Patient is pregnant now ( @ 39w 5d).   Obstetric History                Physical Exam    Airway  Mallampati: II  TM distance: >3 FB  Neck ROM: full  Mouth opening: 3 or more finger widths     Cardiovascular    Dental - normal exam     Pulmonary    Abdominal        Visit Vitals  /74   Pulse 102   Temp 36.3 °C (97.3 °F)   Resp 16       Anesthesia Plan    History of general anesthesia?: no  History of complications of general anesthesia?: unknown/emergency    ASA 2     other   (R/B/A of neuraxial anesthesia discussed. Patient informed of the possibility of requiring general anesthesia if an emergent situation occurred. )  The patient is not a current smoker.  Patient was previously instructed to abstain from smoking on day of procedure.  Patient did not smoke on day of procedure.    Anesthetic plan and risks discussed with patient.  Use of blood products discussed with patient who consented to blood products.    Plan discussed with CRNA and attending.    Patient wishes to deliver naturally (has delivered naturally in previous pregnancies) but is open to receiving an epidural.

## 2025-04-16 NOTE — H&P
OB Admission H&P    ASSESSMENT & PLAN: Yadira Kamara is a 24 y.o.  at 39w5d by LMP c/w 12w4d US, who presents with contractions, desires IOL/augmentation.    Mild Polyhydramnios  -37w5d US: MVP 8.4 cm, ASTRID 23.6 cm    LGA fetus  -37w5d growth: EFW 3711g 91%, AC >99%  -Extrapolates to ~4211g, pelvis proven to 3544g  -Normal 1 hr GCT  -Previously counseled on shoulder dystocia risk, desires     H/o precipitous delivery  -Delivered in ambulance with G4 after SROM at 38w4d    Dehydration  -Udip with 4+ ketones, SG >1.030  -LR 500ml IV fluid bolus, then maintenance at 125ml/hr  -Encourage PO intake    Labor augmentation/induction  -Likely not in labor; toco with irregular and infrequent ctx most likely d/t dehydration.  -Offered 2 hour recheck, patient desires to stay for IOL/augmentation.  -Admit to L&D, consented  -T&S, CBC, and Syphilis  -Epidural at patient request  -Recheck as clinically indicated by maternal or fetal status  -Plan to initiate induction with pitocin per guidelines when appropriate    Fetal Status  -NST reactive, reassuring   -Presentation cephalic based on Ultrasound, Cervical Exam, and Leopold  -EFW 8lbs by Leopold's  -GBS positive, for PCN ppx    -Postpartum Contraception Plan: post-placental paragard IUD, may consider interval    Subjective   Yadira is a 24 y.o.  at 39w5d by LMP, c/w 12w4d US, who presents for contractions that began yesterday evening that she reports as painful. Ctx felt in her lower abdomen and into her left thigh. Overnight they subsided and returned today more mild in nature, about every 10 mins. She denies leaking of fluid and vaginal bleeding. Reports normal fetal movement.     Yadira states she wants to be done with her pregnancy and is requesting to stay for labor augmentation/induction if she is not in labor. Here with her partner.     Prenatal Provider SOFIA Mcnulty    Pregnancy Problems (from 24 to present)       Problem Noted Diagnosed  Resolved    Encounter for planned induction of labor 2025 by Melissa L Zahorsky, APRN-CNM  No    Priority:  Medium       Polyhydramnios in third trimester ([facility]) 2025 by ALBERTINA Salmeron  No    Priority:  Medium       Overview Addendum 3/29/2025  5:18 PM by Melissa L Zahorsky, APRN-CNM   2025 ASTRID 26  3/12/2025 ASTRID 19.3cm, however MVP is 8.0 = mild poly         Fetal size consistent with dates during pregnancy in third trimester ([facility]) 2025 by ALBERTINA Salmeron  No    Priority:  Medium       Overview Addendum 2025  4:47 PM by ALBERTINA Salmeron   Ac97th%tile  KVC22ab%tile    34w5d growth: EFW 2832 84%, AC 98%    38wga -Increased interval fetal growth. The EFW is 3711 g or 91% and the AC is at the >99%  -The ASTRID is high-normal measuring 23.6 cm    Reviewed risks of LGA baby including   Reviewed etiology of shoulder dystocia  Occurrence in approximately 0.2-3% of vaginal deliveries  Discussed it is difficult to predict who may have a shoulder dystocia, however risk factors include:  diabetes in pregnancy (babies are more likely to have macrosomia; show a pattern of greater shoulder/chest/abdominal growth)  fetal macrosomia (3-13% of newborns weighing >4000g)  excessive weight gain in pregnancy (associated with increased rates of fetal macrosomia)  prior history of shoulder dystocia (10-12% recurrence rate)  Discussed maternal and  risks reviewed including: lacerations, PS separation,  brachial plexus injury, fractures to humerus or clavicle, HIE, rarely  death (1 in 25,000 deliveries)  Reviewed weight gain (15); 1hr glucose (93)  Discussed options/risks/benefits of pLTCS vs. vaginal delivery  Patient acknowledges understanding             39 weeks gestation of pregnancy ([facility]) 2024 by ALBERTINA Salmeron  No    Priority:  Medium       Overview Addendum 2025  4:48 PM by YARY Salmeron-SOFIA    Desired provider in labor: [x] CNM  [] Physician  [x] Blood Products: [x] Yes, accepts [] No, needs counseling  [x] Initial BMI: 27.96   [x] Prenatal Labs:   [x] Cervical Cancer Screening up to date ** pending   [x] Rh status: RH+  [x] Genetic Screening:  ordered  [x] NT US: (11-13 wks)  [] Baby ASA (if indicated): n/a  [x] Pregnancy dated by: 12w4d US     [x] Anatomy US: (19-20 wks)  [] [x] 1hr GCT at 24-28wks:  [][] [x] Tdap (27-32 wks, may be given up to 36 wks if initial window missed): declines  [] [x] Flu Vaccine: declined    [x] Breastfeeding: needs pump  [x] Postpartum Birth control method: PPIUD Para CINDI!!!!!  [x] GBS at 36 - 37 wks: collected pending  [x] 39 weeks discussion of IOL vs. Expectant management: declines IOL at 39wga at this time- understands risks of LGA baby at delivery- wishes to wait at this time  [x] Mode of delivery ( anticipated ):                   OB History    Para Term  AB Living   5 3 3 0 1 3   SAB IAB Ectopic Multiple Live Births   1 0 0 0 3      # Outcome Date GA Lbr Holger/2nd Weight Sex Type Anes PTL Lv   5 Current            4 Term 23 38w4d  3.64 kg M Vag-Spont None N BARB      Name: BERKLEY VALLEJO      Apgar1: 7  Apgar5: 7   3 Term 22 38w6d  3.544 kg M Vag-Spont None N BARB   2 SAB 2021           1 Term 10/26/20 40w0d  3.43 kg M Vag-Spont None N BARB       Surgical History[1]    Social History     Tobacco Use    Smoking status: Never    Smokeless tobacco: Never   Substance Use Topics    Alcohol use: Not Currently       Allergies[2]    Prescriptions Prior to Admission[3]  Objective     Last Vitals  Temp Pulse Resp BP MAP O2 Sat   36.3 °C (97.3 °F) 102 16 116/74 90 99 %         Physical Exam  General: NAD, mood appropriate  Cardiopulmonary: warm and well perfused, breathing comfortably on room air  Abdomen: Gravid, non-tender  Extremities: Symmetric  Speculum Exam: deferred  Cervix:  /-2, previously 3/40/-4, 5 days ago in office      Fetal Monitoring  Baseline: 135 bpm, Variability: Moderate, Accelerations: Present and Decelerations: None  Uterine Activity: Irregular contractions, q8-14 minutes  Interpretation: Category 1    Bedside Ultrasound: YES, Findings: cephalic      Prenatal labs reviewed, remarkable for GBS positive                  [1] No past surgical history on file.  [2] No Known Allergies  [3]   Medications Prior to Admission   Medication Sig Dispense Refill Last Dose/Taking    prenatal vit 14-iron fum-folic ([employer]Nate) 29 mg iron- 1 mg tablet,chewable Chew 1 tablet once daily. 90 each 0 Past Month

## 2025-04-17 ENCOUNTER — DOCUMENTATION (OUTPATIENT)
Dept: OBSTETRICS AND GYNECOLOGY | Facility: CLINIC | Age: 25
End: 2025-04-17

## 2025-04-17 PROCEDURE — 1100000001 HC PRIVATE ROOM DAILY

## 2025-04-17 PROCEDURE — 2500000001 HC RX 250 WO HCPCS SELF ADMINISTERED DRUGS (ALT 637 FOR MEDICARE OP): Mod: SE | Performed by: ADVANCED PRACTICE MIDWIFE

## 2025-04-17 PROCEDURE — 7100000016 HC LABOR RECOVERY PER HOUR: Performed by: STUDENT IN AN ORGANIZED HEALTH CARE EDUCATION/TRAINING PROGRAM

## 2025-04-17 RX ORDER — LABETALOL HYDROCHLORIDE 5 MG/ML
20 INJECTION, SOLUTION INTRAVENOUS ONCE AS NEEDED
Status: DISCONTINUED | OUTPATIENT
Start: 2025-04-17 | End: 2025-04-18 | Stop reason: HOSPADM

## 2025-04-17 RX ORDER — CARBOPROST TROMETHAMINE 250 UG/ML
250 INJECTION, SOLUTION INTRAMUSCULAR ONCE AS NEEDED
Status: DISCONTINUED | OUTPATIENT
Start: 2025-04-17 | End: 2025-04-18 | Stop reason: HOSPADM

## 2025-04-17 RX ORDER — ACETAMINOPHEN 325 MG/1
975 TABLET ORAL EVERY 6 HOURS
Status: DISCONTINUED | OUTPATIENT
Start: 2025-04-17 | End: 2025-04-18 | Stop reason: HOSPADM

## 2025-04-17 RX ORDER — TRANEXAMIC ACID 100 MG/ML
1000 INJECTION, SOLUTION INTRAVENOUS ONCE AS NEEDED
Status: DISCONTINUED | OUTPATIENT
Start: 2025-04-17 | End: 2025-04-18 | Stop reason: HOSPADM

## 2025-04-17 RX ORDER — ADHESIVE BANDAGE
10 BANDAGE TOPICAL
Status: DISCONTINUED | OUTPATIENT
Start: 2025-04-17 | End: 2025-04-18 | Stop reason: HOSPADM

## 2025-04-17 RX ORDER — IBUPROFEN 600 MG/1
600 TABLET ORAL EVERY 6 HOURS
Status: DISCONTINUED | OUTPATIENT
Start: 2025-04-17 | End: 2025-04-18 | Stop reason: HOSPADM

## 2025-04-17 RX ORDER — LOPERAMIDE HYDROCHLORIDE 2 MG/1
4 CAPSULE ORAL EVERY 2 HOUR PRN
Status: DISCONTINUED | OUTPATIENT
Start: 2025-04-17 | End: 2025-04-18 | Stop reason: HOSPADM

## 2025-04-17 RX ORDER — DIPHENHYDRAMINE HCL 25 MG
25 CAPSULE ORAL EVERY 6 HOURS PRN
Status: DISCONTINUED | OUTPATIENT
Start: 2025-04-17 | End: 2025-04-18 | Stop reason: HOSPADM

## 2025-04-17 RX ORDER — METHYLERGONOVINE MALEATE 0.2 MG/ML
0.2 INJECTION INTRAVENOUS ONCE AS NEEDED
Status: DISCONTINUED | OUTPATIENT
Start: 2025-04-17 | End: 2025-04-18 | Stop reason: HOSPADM

## 2025-04-17 RX ORDER — DIPHENHYDRAMINE HYDROCHLORIDE 50 MG/ML
25 INJECTION, SOLUTION INTRAMUSCULAR; INTRAVENOUS EVERY 6 HOURS PRN
Status: DISCONTINUED | OUTPATIENT
Start: 2025-04-17 | End: 2025-04-18 | Stop reason: HOSPADM

## 2025-04-17 RX ORDER — SIMETHICONE 80 MG
80 TABLET,CHEWABLE ORAL 4 TIMES DAILY PRN
Status: DISCONTINUED | OUTPATIENT
Start: 2025-04-17 | End: 2025-04-18 | Stop reason: HOSPADM

## 2025-04-17 RX ORDER — MISOPROSTOL 200 UG/1
800 TABLET ORAL ONCE AS NEEDED
Status: DISCONTINUED | OUTPATIENT
Start: 2025-04-17 | End: 2025-04-18 | Stop reason: HOSPADM

## 2025-04-17 RX ORDER — POLYETHYLENE GLYCOL 3350 17 G/17G
17 POWDER, FOR SOLUTION ORAL 2 TIMES DAILY PRN
Status: DISCONTINUED | OUTPATIENT
Start: 2025-04-17 | End: 2025-04-18 | Stop reason: HOSPADM

## 2025-04-17 RX ORDER — ONDANSETRON HYDROCHLORIDE 2 MG/ML
4 INJECTION, SOLUTION INTRAVENOUS EVERY 6 HOURS PRN
Status: DISCONTINUED | OUTPATIENT
Start: 2025-04-17 | End: 2025-04-18 | Stop reason: HOSPADM

## 2025-04-17 RX ORDER — OXYTOCIN/0.9 % SODIUM CHLORIDE 30/500 ML
60 PLASTIC BAG, INJECTION (ML) INTRAVENOUS ONCE AS NEEDED
Status: DISCONTINUED | OUTPATIENT
Start: 2025-04-17 | End: 2025-04-18 | Stop reason: HOSPADM

## 2025-04-17 RX ORDER — ONDANSETRON 4 MG/1
4 TABLET, FILM COATED ORAL EVERY 6 HOURS PRN
Status: DISCONTINUED | OUTPATIENT
Start: 2025-04-17 | End: 2025-04-18 | Stop reason: HOSPADM

## 2025-04-17 RX ORDER — OXYTOCIN 10 [USP'U]/ML
10 INJECTION, SOLUTION INTRAMUSCULAR; INTRAVENOUS ONCE AS NEEDED
Status: DISCONTINUED | OUTPATIENT
Start: 2025-04-17 | End: 2025-04-18 | Stop reason: HOSPADM

## 2025-04-17 RX ORDER — HYDRALAZINE HYDROCHLORIDE 20 MG/ML
5 INJECTION INTRAMUSCULAR; INTRAVENOUS ONCE AS NEEDED
Status: DISCONTINUED | OUTPATIENT
Start: 2025-04-17 | End: 2025-04-18 | Stop reason: HOSPADM

## 2025-04-17 RX ADMIN — ACETAMINOPHEN 975 MG: 325 TABLET, FILM COATED ORAL at 12:32

## 2025-04-17 RX ADMIN — ACETAMINOPHEN 975 MG: 325 TABLET, FILM COATED ORAL at 00:16

## 2025-04-17 RX ADMIN — IBUPROFEN 600 MG: 600 TABLET ORAL at 00:16

## 2025-04-17 RX ADMIN — IBUPROFEN 600 MG: 600 TABLET ORAL at 12:33

## 2025-04-17 SDOH — ECONOMIC STABILITY: FOOD INSECURITY: WITHIN THE PAST 12 MONTHS, YOU WORRIED THAT YOUR FOOD WOULD RUN OUT BEFORE YOU GOT THE MONEY TO BUY MORE.: NEVER TRUE

## 2025-04-17 SDOH — SOCIAL STABILITY: SOCIAL INSECURITY: WITHIN THE LAST YEAR, HAVE YOU BEEN HUMILIATED OR EMOTIONALLY ABUSED IN OTHER WAYS BY YOUR PARTNER OR EX-PARTNER?: NO

## 2025-04-17 SDOH — SOCIAL STABILITY: SOCIAL INSECURITY
WITHIN THE LAST YEAR, HAVE YOU BEEN KICKED, HIT, SLAPPED, OR OTHERWISE PHYSICALLY HURT BY YOUR PARTNER OR EX-PARTNER?: NO

## 2025-04-17 SDOH — SOCIAL STABILITY: SOCIAL INSECURITY: WITHIN THE LAST YEAR, HAVE YOU BEEN AFRAID OF YOUR PARTNER OR EX-PARTNER?: NO

## 2025-04-17 SDOH — ECONOMIC STABILITY: FOOD INSECURITY: WITHIN THE PAST 12 MONTHS, THE FOOD YOU BOUGHT JUST DIDN'T LAST AND YOU DIDN'T HAVE MONEY TO GET MORE.: NEVER TRUE

## 2025-04-17 SDOH — SOCIAL STABILITY: SOCIAL INSECURITY
WITHIN THE LAST YEAR, HAVE YOU BEEN RAPED OR FORCED TO HAVE ANY KIND OF SEXUAL ACTIVITY BY YOUR PARTNER OR EX-PARTNER?: NO

## 2025-04-17 ASSESSMENT — PAIN SCALES - GENERAL
PAINLEVEL_OUTOF10: 0 - NO PAIN

## 2025-04-17 NOTE — LACTATION NOTE
Lactation Consultant Note      25 1065   Lactation Consultation   Reason for Consult Initial assessment   Consultant Name Irene Arriola RN, IBCLC   Maternal Information   Has mother  before? Yes   How long did the mother previously breastfeed? 8 months with now 1 year old, 1 year with other son, 2 years with other son   Infant to breast within first 2 hours of birth? Yes   Exclusive Pump and Bottle Feed No   Maternal Assessment   Breast Assessment Medium;Soft;Warm;Compressible  (easily expressible)   Nipple Assessment Intact;Erect   Areola Assessment Normal   Infant Assessment   Infant Behavior Light sleep;Quiet alert   Infant Assessment Good cupping of tongue;Tongue protrudes over alveolar ridge   Feeding Assessment   Nutrition Source Breastmilk;Formula (per mother’s request)   Feeding Method Nursing at the breast;Paced bottle   Feeding Position Cradle;Cross - cradle;Skin to skin;One side per feeding;Mother needs assistance with latch/positioning   Suck/Feeding Sustained;Tactile stimulation needed;Audible swallowing with stimulaton   Latch Assessment Minimal assistance is needed;Instructed on deep latch;Deep latch obtained;Optimal angle of mouth opening;Flanged lips;Areolar attachment   LATCH Tool   Latch 1   Audible Swallowing 1   Type of Nipple 2   Comfort (Breast/Nipple) 2   Hold (Positioning) 1   LATCH Score 7   Patient Follow-Up   Inpatient Lactation Follow-up Needed  Yes   Outpatient Lactation Follow-up Recommended   Other OB Lactation Documentation    Infant Risk Factors High birth weight >3600 g          Recommendations/Summary  Upon entering the room, mother states she was just about to feed infant a breast, I offered assistance with latch/feed, mother agreeable. Mother states she has been latching infant a bit but also feeding formula, goal is to not use any more formula. Discussed with mother typical milk supply pattern and  feeding pattern in the early postpartum period.  Encouraged mother to feed infant at breast with each feed and call for assistance with latch as needed. Mother states she has experience breastfeeding her 3 sons at home ranging from 8 months to 2 years.    Undressed infant to diaper and placed skin to skin with mother. Mother began placing infant at left breast in modified cradle hold with right hand holding breast and left arm around infant. I suggested moving to cross-cradle hold as I noticed infant's head was too high over breast and infant not tucked into mother belly to belly.     In cross-cradle hold, assisted by creating breast sandwich and infant readily latched to left breast with areolar attachment, nose and chin touching breast, lips flanged, a few sucks with long jaw movement and some audible swallows. Assisted mother to move back to cradle hold after latch achieved. Encouraged mother to provide tactile stimulation to keep infant feeding at breast as long as possible until infant unlatches or stops responding to tactile stimulation, then can unlatch.     Mother states she does not have a breast pump for home use but provider looking into placing order.     Will need follow up before discharge regarding pump order.     Outpatient lactation resources discussed with mother. I encouraged mother to call for any questions, concerns or assistance.

## 2025-04-17 NOTE — PROGRESS NOTES
Assessment    24 y.o.  at 39w5d  FHT Category 1  Latent labor  GBS +  LGA Fetus    Plan      Encourage frequent position changes as tolerated  Maternal repositioning  Start/Continue pitocin per protocol  PCN GBS prophylaxis  AROM for clear fluid    Julianna Mcnulty, APRN-CNM    Subjective:  Yadira Kamara is breathing through contractions. Desires AROM. Risks benefits/ alternatives reviewed BBOW noted on Exam.     Objective:  Fetal Monitoring      Baseline FHR: 153  per minute  Variability: moderate  Accelerations: yes  Decelerations: none  TOCO: Contraction Frequency: 1.5-3.5     Cervical Exam:  5 cm dilated, 80 effaced, -1 station    Membrane Status: Intact  Rupture Date: 25  Rupture Time: 2250       Pitocin is at 10 mu-units/min.    Vitals:    259 25 2131 25 2232 25 2233   BP: 135/88 123/75  122/65   Pulse: 94 90 98 100   Resp: 16 16     Temp:       TempSrc:       SpO2: 100% 100% 99%    Weight:       Height:

## 2025-04-17 NOTE — L&D DELIVERY NOTE
"Delivery Note: Vaginal     Review the Delivery Report for details.     Yadira Kamara is a 24 y.o., now  with an estimated date of delivery of 25, who delivered at 39w6d gestation.     Membrane rupture occurred at 10:50 PM on 2025, and the amniotic fluid was  . With   anesthesia, the patient's labor was induced with OXYTOCIN;AROM    Delivery Procedure     A controlled vaginal delivery was completed at 11:46 PM on 2025. The baby was a live ,  , female infant, \"Reya\" with APGAR scores of  at 1 min /  at 5 min, delivered by Vaginal, Spontaneous from an occiput anterior position.  loop(s) of nuchal cord was identified and reduced. After an appropriate delay, the cord was doubly clamped and cut. The umbilical cord was found to have 3 vessels. Skin to Skin was not initiated secondary to  . The  was passed to the awaiting infant care team. The placenta was  and appeared  . Pitocin was given immediately after delivery.  The uterine cavity was not examined. Fundal massage was performed, and the fundus was firm. Episiotomy was not done.Perineum, vagina, labia, and cervix were inspected and found to have no lacerations that were not repaired in the usual fashion. Hemostasis was confirmed on final inspection. Final sponge, needle, and instrument counts were reported and correct.   QBL 350cc  Delivery Complications:  None     Patient decided against PP copper IUD- will do interval instead  The patient tolerated the delivery well and is in good and stable condition.  Julianna PRINGLE CNM    "

## 2025-04-17 NOTE — PROGRESS NOTES
Postpartum Progress Note    Jareth Kamara is a 24 y.o., , who delivered at 39w5d gestation and is now postpartum day 1.  Hospital course: no complications    Her pain is moderately controlled with current medications  Her bleeding is minimal   She is having trouble urinating or passing gas   She is ambulating well  She reports no breast or nursing problems  She denies emotional concerns today   Her plan for contraception is IUD interval at postpartum visit.        Pregnancy Problems (from 24 to present)       Problem Noted Diagnosed Resolved    Polyhydramnios in third trimester ([facility]) 2025 by ALBERTINA Salmeron  No    Priority:  Medium       Overview Addendum 3/29/2025  5:18 PM by Melissa L Zahorsky, APRN-CNM   2025 ASTRID 26  3/12/2025 ASTRID 19.3cm, however MVP is 8.0 = mild poly         Fetal size consistent with dates during pregnancy in third trimester ([facility]) 2025 by ALBERTINA Salmeron  No    Priority:  Medium       Overview Addendum 2025  4:47 PM by ALBERTINA Salmeron   Ac97th%tile  TJG81gj%tile    34w5d growth: EFW 2832 84%, AC 98%    38wga -Increased interval fetal growth. The EFW is 3711 g or 91% and the AC is at the >99%  -The ASTRID is high-normal measuring 23.6 cm    Reviewed risks of LGA baby including   Reviewed etiology of shoulder dystocia  Occurrence in approximately 0.2-3% of vaginal deliveries  Discussed it is difficult to predict who may have a shoulder dystocia, however risk factors include:  diabetes in pregnancy (babies are more likely to have macrosomia; show a pattern of greater shoulder/chest/abdominal growth)  fetal macrosomia (3-13% of newborns weighing >4000g)  excessive weight gain in pregnancy (associated with increased rates of fetal macrosomia)  prior history of shoulder dystocia (10-12% recurrence rate)  Discussed maternal and  risks reviewed including: lacerations, PS separation,   brachial plexus injury, fractures to humerus or clavicle, HIE, rarely  death (1 in 25,000 deliveries)  Reviewed weight gain (15); 1hr glucose (93)  Discussed options/risks/benefits of pLTCS vs. vaginal delivery  Patient acknowledges understanding             39 weeks gestation of pregnancy ([facility]) 2024 by ALBERTINA Salmeron  No    Priority:  Medium       Overview Addendum 2025  4:48 PM by ALBERTINA Salmeron   Desired provider in labor: [x] CNM  [] Physician  [x] Blood Products: [x] Yes, accepts [] No, needs counseling  [x] Initial BMI: 27.96   [x] Prenatal Labs:   [x] Cervical Cancer Screening up to date ** pending   [x] Rh status: RH+  [x] Genetic Screening:  ordered  [x] NT US: (11-13 wks)  [] Baby ASA (if indicated): n/a  [x] Pregnancy dated by: 12w4d US     [x] Anatomy US: (19-20 wks)  [] [x] 1hr GCT at 24-28wks:  [][] [x] Tdap (27-32 wks, may be given up to 36 wks if initial window missed): declines  [] [x] Flu Vaccine: declined    [x] Breastfeeding: needs pump  [x] Postpartum Birth control method: PPIUD Para CINDI!!!!!  [x] GBS at 36 - 37 wks: collected pending  [x] 39 weeks discussion of IOL vs. Expectant management: declines IOL at 39wga at this time- understands risks of LGA baby at delivery- wishes to wait at this time  [x] Mode of delivery ( anticipated ):           Encounter for planned induction of labor 2025 by Melissa L Zahorsky, APRN-CNM  No            Objective   Allergies:   Patient has no known allergies.         Last Vitals:  Temp Pulse Resp BP MAP Pulse Ox   36.6 °C (97.9 °F) 85 18 114/79   99 %     Vitals Min/Max Last 24 Hours:  Temp  Min: 36 °C (96.8 °F)  Max: 37.1 °C (98.8 °F)  Pulse  Min: 78  Max: 106  Resp  Min: 16  Max: 20  BP  Min: 102/70  Max: 135/88    Physical Exam:  General: Examination reveals a well developed, well nourished, female, in no acute distress. She is alert and cooperative  HEENT: External ears normal. Nose normal, no  erythema or discharge  Lungs: breathing even and unlabored   Cardiac: warm and well perfused   Breasts: nipples intact  Fundus: firm and below umbilicus, lochia light  Extremities: no redness or tenderness in the calves or thighs, no edema  Neurological: alert, oriented, normal speech, no focal findings or movement disorder noted  Psychological: awake and alert; oriented to person, place, and time    Lab Data:  Lab Results   Component Value Date    WBC 9.1 2025    HGB 14.5 2025    HCT 42.1 2025     2025       Results from last 7 days   Lab Units 25  1528   HEMOGLOBIN g/dL 14.5        Assessment/Plan   Routine postpartum care  - meeting all milestones  - bonding with infant  - pain well controlled on po medications  - dvt risk score DVT Score (IF A SCORE IS NOT CALCULATING, MUST SELECT A BMI TO COMPLETE): 3 , prophylactic lovenox not indicated  - Patient is Rh Positive (Rh+).,   - continue routine postpartum care    Maternal Well-Being  - emotional support provided    [place] Feeding  - breastfeeding/pumping encouraged; lactation consult prn    Contraception  - Contraception methods discussed, including contraindication for use of estrogen in immediate postpartum period    Dispo  - anticipate d/c on PPD #2 if meeting all postpartum milestones  Follow up in 4-6 wk for postpartum visit with your OB provider.     ALBERTINA Gardner

## 2025-04-17 NOTE — SIGNIFICANT EVENT
Pitocin turned off for what look like recurrent lates- computer reads them as early decelerations.    Pit off a this time- patient feeling overwhelming urge to push at times-     SVE: 7.5/90/0  TOCO: regular q2 min exactly 5 contracitons in 10min    Pit 10--> 8--> off      Patient turned to right side  IV fluids going  Anticipate 2nd stage    Julianna PRINGLE CNM

## 2025-04-17 NOTE — DISCHARGE INSTRUCTIONS

## 2025-04-17 NOTE — PROGRESS NOTES
Intrapartum Progress Note    Assessment/Plan   Yadira Kamara is a 24 y.o.  at 39w5d. DIANA: 2025, by Last Menstrual Period.     rrIOL   GBS+  LGA fetus  Cat 1    2nd dose of PCN due  Increase pitocin as needed  At next exam will consider AROM    Julianna PRINGLE CNM      Assessment & Plan  Encounter for planned induction of labor    Pregnancy Problems (from 24 to present)       Problem Noted Diagnosed Resolved    Encounter for planned induction of labor 2025 by Melissa L Zahorsky, APRN-CNM  No    Priority:  Medium       Polyhydramnios in third trimester ([facility]) 2025 by ALBERTINA Salmeron  No    Priority:  Medium       Overview Addendum 3/29/2025  5:18 PM by Melissa L Zahorsky, APRN-CNM   2025 ASTRID 26  3/12/2025 ASTRID 19.3cm, however MVP is 8.0 = mild poly         Fetal size consistent with dates during pregnancy in third trimester ([facility]) 2025 by ALBERTINA Salmeron  No    Priority:  Medium       Overview Addendum 2025  4:47 PM by ALBERTINA Salmeron   Ac97th%tile  EKU29wo%tile    34w5d growth: EFW 2832 84%, AC 98%    38wga -Increased interval fetal growth. The EFW is 3711 g or 91% and the AC is at the >99%  -The ASTRID is high-normal measuring 23.6 cm    Reviewed risks of LGA baby including   Reviewed etiology of shoulder dystocia  Occurrence in approximately 0.2-3% of vaginal deliveries  Discussed it is difficult to predict who may have a shoulder dystocia, however risk factors include:  diabetes in pregnancy (babies are more likely to have macrosomia; show a pattern of greater shoulder/chest/abdominal growth)  fetal macrosomia (3-13% of newborns weighing >4000g)  excessive weight gain in pregnancy (associated with increased rates of fetal macrosomia)  prior history of shoulder dystocia (10-12% recurrence rate)  Discussed maternal and  risks reviewed including: lacerations, PS separation,  brachial plexus injury,  fractures to humerus or clavicle, HIE, rarely  death (1 in 25,000 deliveries)  Reviewed weight gain (15); 1hr glucose (93)  Discussed options/risks/benefits of pLTCS vs. vaginal delivery  Patient acknowledges understanding             39 weeks gestation of pregnancy ([facility]) 2024 by ALBERTINA Salmeron  No    Priority:  Medium       Overview Addendum 2025  4:48 PM by ALBERTINA Salmeron   Desired provider in labor: [x] CNM  [] Physician  [x] Blood Products: [x] Yes, accepts [] No, needs counseling  [x] Initial BMI: 27.96   [x] Prenatal Labs:   [x] Cervical Cancer Screening up to date ** pending   [x] Rh status: RH+  [x] Genetic Screening:  ordered  [x] NT US: (11-13 wks)  [] Baby ASA (if indicated): n/a  [x] Pregnancy dated by: 12w4d US     [x] Anatomy US: (19-20 wks)  [] [x] 1hr GCT at 24-28wks:  [][] [x] Tdap (27-32 wks, may be given up to 36 wks if initial window missed): declines  [] [x] Flu Vaccine: declined    [x] Breastfeeding: needs pump  [x] Postpartum Birth control method: PPIUD Para CINDI!!!!!  [x] GBS at 36 - 37 wks: collected pending  [x] 39 weeks discussion of IOL vs. Expectant management: declines IOL at 39wga at this time- understands risks of LGA baby at delivery- wishes to wait at this time  [x] Mode of delivery ( anticipated ):                   Subjective   Patient feeling no contractions at this time- on the yoga ball-    Objective   Last Vitals:  Temp Pulse Resp BP MAP Pulse Ox   36.2 °C (97.2 °F) 94 16 135/88 105 97 % (taken during BP reading, increased after BP taken)     Vitals Min/Max Last 24 Hours:  Temp  Min: 36 °C (96.8 °F)  Max: 36.3 °C (97.3 °F)  Pulse  Min: 89  Max: 106  Resp  Min: 16  Max: 16  BP  Min: 102/70  Max: 135/88  MAP (mmHg)  Min: 87  Max: 105    Intake/Output:  No intake or output data in the 24 hours ending 25    Physical Examination:  GENERAL: Examination reveals a well developed, well nourished, gravid female in no  acute distress. She is alert and cooperative.  ABDOMEN:  tight with Ucs, non-tender  FHR is 136, moderate variability , with Accelerations, and a   cat 1tracing.    [place] reading:  regular q 2-5  CERVIX: 4 cm dilated, 50 % effaced, -2 station; MEMBRANES are Intact  PSYCHOLOGICAL: awake and alert; oriented to person, place, and time      Lab Review:  Labs in chart were reviewed.

## 2025-04-17 NOTE — CARE PLAN
Problem: Pain - Adult  Goal: Verbalizes/displays adequate comfort level or baseline comfort level  Outcome: Progressing     Problem: Safety - Adult  Goal: Free from fall injury  Outcome: Progressing     Problem: Discharge Planning  Goal: Discharge to home or other facility with appropriate resources  Outcome: Progressing   The patient's goals for the shift include Rest    The clinical goals for the shift include Meet postpartum milestones    Over the shift, the patient did make progress toward the following goals. Patient engaged in  care and agreeable to plan of care.

## 2025-04-18 ENCOUNTER — PHARMACY VISIT (OUTPATIENT)
Dept: PHARMACY | Facility: CLINIC | Age: 25
End: 2025-04-18
Payer: MEDICAID

## 2025-04-18 VITALS
HEIGHT: 66 IN | RESPIRATION RATE: 18 BRPM | HEART RATE: 96 BPM | BODY MASS INDEX: 29.21 KG/M2 | OXYGEN SATURATION: 97 % | DIASTOLIC BLOOD PRESSURE: 68 MMHG | SYSTOLIC BLOOD PRESSURE: 105 MMHG | TEMPERATURE: 97.9 F | WEIGHT: 181.77 LBS

## 2025-04-18 PROBLEM — Z34.93: Status: RESOLVED | Noted: 2025-02-21 | Resolved: 2025-04-18

## 2025-04-18 PROBLEM — O26.10 POOR WEIGHT GAIN OF PREGNANCY (HHS-HCC): Status: RESOLVED | Noted: 2023-11-10 | Resolved: 2025-04-18

## 2025-04-18 PROBLEM — O40.3XX0 POLYHYDRAMNIOS IN THIRD TRIMESTER (HHS-HCC): Status: RESOLVED | Noted: 2025-02-21 | Resolved: 2025-04-18

## 2025-04-18 PROCEDURE — RXMED WILLOW AMBULATORY MEDICATION CHARGE

## 2025-04-18 PROCEDURE — 99238 HOSP IP/OBS DSCHRG MGMT 30/<: CPT

## 2025-04-18 RX ORDER — IBUPROFEN 600 MG/1
600 TABLET ORAL EVERY 6 HOURS PRN
Qty: 90 TABLET | Refills: 2 | Status: SHIPPED | OUTPATIENT
Start: 2025-04-18

## 2025-04-18 RX ORDER — PRENATAL VIT 14/IRON FUM/FOLIC 29 MG-1 MG
1 TABLET,CHEWABLE ORAL DAILY
Qty: 30 EACH | Refills: 6 | Status: SHIPPED | OUTPATIENT
Start: 2025-04-18

## 2025-04-18 RX ORDER — ACETAMINOPHEN 325 MG/1
975 TABLET ORAL EVERY 6 HOURS PRN
Qty: 90 TABLET | Refills: 2 | Status: SHIPPED | OUTPATIENT
Start: 2025-04-18

## 2025-04-18 ASSESSMENT — PAIN SCALES - GENERAL: PAINLEVEL_OUTOF10: 1

## 2025-04-18 ASSESSMENT — PAIN DESCRIPTION - DESCRIPTORS: DESCRIPTORS: CRAMPING

## 2025-04-18 NOTE — LACTATION NOTE
Lactation Consultant Note    Recommendations/Summary  Mom needed to order a pump prior to her discharge. She would like a wearable pump so I will submit her order now via [employer] and they will call her to review her options.

## 2025-04-18 NOTE — DISCHARGE SUMMARY
Discharge Summary    Admission Date: 4/16/2025  Discharge Date: 4/18/2025    Discharge Diagnosis  Normal vaginal delivery ([facility])    Hospital Course  Delivery Date: 4/16/2025 11:46 PM  Delivery type: Vaginal, Spontaneous   GA at delivery: 39w5d  Outcome: Living  Anesthesia during delivery: None  Intrapartum complications: None  Feeding method: Breastfeeding Status: Yes     Procedures: none  Contraception at discharge: Interval paragard IUD    PPD#2  Patient doing well overall, denies concerns today. No acute events overnight. Her pain is well controlled with current medications. She is passing flatus. She is ambulating well. She is tolerating a regular diet. She reports no breast or nursing problems, infant with beautiful latch at time of exam. She reports her lochia as very light She denies emotional concerns today. She is feeling ready to go home.    Pertinent Physical Exam At Time of Discharge  General: Examination reveals a well developed, well nourished, female, in no acute distress. She is alert and cooperative.  Lungs: normal respiratory effort.  Fundus: firm, below umbilicus, and nontender. Lochia light.   Perineum: well-approximated, non-edematous  Extremities: no redness or tenderness in the calves or thighs, no edema.  Psychological: awake and alert; oriented to person, place, and time.    Last Vitals:  Temp Pulse Resp BP MAP Pulse Ox   37.3 °C (99.1 °F) 85 17 108/72 84 96 %     Discharge Meds     Your medication list        START taking these medications        Instructions Last Dose Given Next Dose Due   acetaminophen 325 mg tablet  Commonly known as: Tylenol      Take 3 tablets (975 mg) by mouth every 6 hours if needed for mild pain (1 - 3).       ibuprofen 600 mg tablet      Take 1 tablet (600 mg) by mouth every 6 hours if needed for mild pain (1 - 3) or moderate pain (4 - 6).              CONTINUE taking these medications        Instructions Last Dose Given Next Dose Due   CompleteNate 29 mg iron-  1 mg tablet,chewable  Generic drug: prenatal vit 14-iron fum-folic      Chew 1 tablet once daily.                 Where to Get Your Medications        These medications were sent to [facility]  [address] [address]      Hours: 8:30 AM to 5 PM Mon-Fri Phone: [phone]   acetaminophen 325 mg tablet  CompleteNate 29 mg iron- 1 mg tablet,chewable  ibuprofen 600 mg tablet          Complications Requiring Follow-Up  None    Outpatient Follow-Up  Follow up in 4-6 weeks for routine postpartum visit.    I spent <30 minutes in the professional and overall care of this patient.      Melissa L Zahorsky, APRN-SOFIA

## 2025-04-18 NOTE — CARE PLAN
The patient's goals for the shift include rest and be discharged this afternoon    The clinical goals for the shift include stable vs until discharge home later today        Problem: Pain - Adult  Goal: Verbalizes/displays adequate comfort level or baseline comfort level  Outcome: Met     Problem: Safety - Adult  Goal: Free from fall injury  Outcome: Met     Problem: Discharge Planning  Goal: Discharge to home or other facility with appropriate resources  Outcome: Met     Problem: Postpartum  Goal: Experiences normal postpartum course  Outcome: Met  Goal: Appropriate maternal -  bonding  Outcome: Met  Goal: Establish and maintain infant feeding pattern for adequate nutrition  Outcome: Met  Goal: Incisions, wounds, or drain sites healing without S/S of infection  Outcome: Met  Goal: No s/sx infection  Outcome: Met  Goal: No s/sx of hemorrhage  Outcome: Met  Goal: Minimal s/sx of HDP and BP<160/110  Outcome: Met    VSS, voiding, pain well controlled, bleeding minimal, breast and bottle feeding.

## 2025-04-21 ENCOUNTER — APPOINTMENT (OUTPATIENT)
Dept: OBSTETRICS AND GYNECOLOGY | Facility: CLINIC | Age: 25
End: 2025-04-21
Payer: MEDICAID

## 2025-05-30 ENCOUNTER — POSTPARTUM VISIT (OUTPATIENT)
Dept: OBSTETRICS AND GYNECOLOGY | Facility: CLINIC | Age: 25
End: 2025-05-30
Payer: MEDICAID

## 2025-05-30 VITALS
WEIGHT: 174 LBS | SYSTOLIC BLOOD PRESSURE: 116 MMHG | BODY MASS INDEX: 28.08 KG/M2 | HEART RATE: 66 BPM | DIASTOLIC BLOOD PRESSURE: 79 MMHG

## 2025-05-30 DIAGNOSIS — Z30.017 INSERTION OF NEXPLANON: ICD-10-CM

## 2025-05-30 PROCEDURE — 99213 OFFICE O/P EST LOW 20 MIN: CPT | Performed by: ADVANCED PRACTICE MIDWIFE

## 2025-05-30 PROCEDURE — 2500000004 HC RX 250 GENERAL PHARMACY W/ HCPCS (ALT 636 FOR OP/ED): Mod: JZ,SE | Performed by: ADVANCED PRACTICE MIDWIFE

## 2025-05-30 RX ADMIN — ETONOGESTREL 1 EACH: 68 IMPLANT SUBCUTANEOUS at 12:45

## 2025-05-30 ASSESSMENT — EDINBURGH POSTNATAL DEPRESSION SCALE (EPDS)
I HAVE FELT SAD OR MISERABLE: NOT VERY OFTEN
I HAVE BEEN SO UNHAPPY THAT I HAVE HAD DIFFICULTY SLEEPING: NOT VERY OFTEN
I HAVE LOOKED FORWARD WITH ENJOYMENT TO THINGS: AS MUCH AS I EVER DID
THINGS HAVE BEEN GETTING ON TOP OF ME: NO, MOST OF THE TIME I HAVE COPED QUITE WELL
TOTAL SCORE: 6
I HAVE BLAMED MYSELF UNNECESSARILY WHEN THINGS WENT WRONG: NO, NEVER
I HAVE BEEN ABLE TO LAUGH AND SEE THE FUNNY SIDE OF THINGS: AS MUCH AS I ALWAYS COULD
I HAVE FELT SCARED OR PANICKY FOR NO GOOD REASON: NO, NOT MUCH
I HAVE BEEN ANXIOUS OR WORRIED FOR NO GOOD REASON: HARDLY EVER
THE THOUGHT OF HARMING MYSELF HAS OCCURRED TO ME: NEVER
I HAVE BEEN SO UNHAPPY THAT I HAVE BEEN CRYING: ONLY OCCASIONALLY

## 2025-05-30 ASSESSMENT — ENCOUNTER SYMPTOMS
NEUROLOGICAL NEGATIVE: 0
CONSTITUTIONAL NEGATIVE: 0
RESPIRATORY NEGATIVE: 0
PSYCHIATRIC NEGATIVE: 0
CARDIOVASCULAR NEGATIVE: 0
ALLERGIC/IMMUNOLOGIC NEGATIVE: 0
EYES NEGATIVE: 0
MUSCULOSKELETAL NEGATIVE: 0
GASTROINTESTINAL NEGATIVE: 0
ENDOCRINE NEGATIVE: 0
HEMATOLOGIC/LYMPHATIC NEGATIVE: 0

## 2025-05-30 ASSESSMENT — PAIN SCALES - GENERAL: PAINLEVEL_OUTOF10: 0-NO PAIN

## 2025-05-30 NOTE — PROGRESS NOTES
Subjective   24 y.o.  presenting for postpartum follow-up     Delivery Date: 2025  Gestational Age: 39w5d   Type of Delivery: Vaginal, Spontaneous     Pregnancy Problems (from 24 to present)       Problem Noted Diagnosed Resolved    Normal vaginal delivery ([facility]) 2025 by Melissa L Zahorsky, APRN-CNM  No    Priority:  Medium       39 weeks gestation of pregnancy ([facility]) 2024 by ALBERTINA Salmeron  No    Priority:  Medium       Overview Addendum 2025  4:48 PM by ALBERTINA Salmeron   Desired provider in labor: [x] CNM  [] Physician  [x] Blood Products: [x] Yes, accepts [] No, needs counseling  [x] Initial BMI: 27.96   [x] Prenatal Labs:   [x] Cervical Cancer Screening up to date ** pending   [x] Rh status: RH+  [x] Genetic Screening:  ordered  [x] NT US: (11-13 wks)  [] Baby ASA (if indicated): n/a  [x] Pregnancy dated by: 12w4d US     [x] Anatomy US: (19-20 wks)  [] [x] 1hr GCT at 24-28wks:  [][] [x] Tdap (27-32 wks, may be given up to 36 wks if initial window missed): declines  [] [x] Flu Vaccine: declined    [x] Breastfeeding: needs pump  [x] Postpartum Birth control method: PPIUD Para CINDI!!!!!  [x] GBS at 36 - 37 wks: collected pending  [x] 39 weeks discussion of IOL vs. Expectant management: declines IOL at 39wga at this time- understands risks of LGA baby at delivery- wishes to wait at this time  [x] Mode of delivery ( anticipated ):           Polyhydramnios in third trimester ([facility]) 2025 by ALBERTINA Salmeron  2025 by Melissa L Zahorsky, APRN-CNM    Priority:  Medium       Overview Addendum 3/29/2025  5:18 PM by Melissa L Zahorsky, APRN-CNM   2025 ASTRID 26  3/12/2025 ASTRID 19.3cm, however MVP is 8.0 = mild poly         Fetal size consistent with dates during pregnancy in third trimester ([facility]) 2025 by YARY Salmeron-SOFIA  2025 by Melissa L Zahorsky, APRN-SOFIA    Priority:  Medium       Overview  Addendum 2025  4:47 PM by Julianna Mcnulty, APRN-CNM   Ac97th%tile  OIH95xd%tile    34w5d growth: EFW 2832 84%, AC 98%    38wga -Increased interval fetal growth. The EFW is 3711 g or 91% and the AC is at the >99%  -The ASTRID is high-normal measuring 23.6 cm    Reviewed risks of LGA baby including   Reviewed etiology of shoulder dystocia  Occurrence in approximately 0.2-3% of vaginal deliveries  Discussed it is difficult to predict who may have a shoulder dystocia, however risk factors include:  diabetes in pregnancy (babies are more likely to have macrosomia; show a pattern of greater shoulder/chest/abdominal growth)  fetal macrosomia (3-13% of newborns weighing >4000g)  excessive weight gain in pregnancy (associated with increased rates of fetal macrosomia)  prior history of shoulder dystocia (10-12% recurrence rate)  Discussed maternal and  risks reviewed including: lacerations, PS separation,  brachial plexus injury, fractures to humerus or clavicle, HIE, rarely  death (1 in 25,000 deliveries)  Reviewed weight gain (15); 1hr glucose (93)  Discussed options/risks/benefits of pLTCS vs. vaginal delivery  Patient acknowledges understanding                     Concerns: none    Pain: controlled  Lacerations: none  Lochia: none  Sexual Intimacy: No  Contraceptive Method: none waants nexplanon today   Feeding Method: She is breast feeding exclusively. no breast or nursing problems  Lactation Consult Needed?: No    Birth Trauma: No  Bonding with Baby: well with baby girl, Jney    Mood:   Postpartum Depression: Medium Risk (2025)    [place]  Depression Scale     Last EPDS Total Score: 6     Last EPDS Self Harm Result: Never     Last pap: 2024    Objective    /79   Pulse 66   Wt 78.9 kg (174 lb)   LMP 2024 (Exact Date)   Breastfeeding Yes   BMI 28.08 kg/m²    Physical Exam  Constitutional:       Appearance: Normal appearance. She is normal weight.    Genitourinary:      Genitourinary Comments: deferred   Cardiovascular:      Rate and Rhythm: Normal rate and regular rhythm.      Pulses: Normal pulses.      Heart sounds: Normal heart sounds.   Pulmonary:      Effort: Pulmonary effort is normal.   Abdominal:      General: Abdomen is flat. Bowel sounds are normal.      Palpations: Abdomen is soft.   Musculoskeletal:         General: Normal range of motion.      Cervical back: Normal range of motion.   Skin:     General: Skin is warm and dry.   Psychiatric:         Mood and Affect: Mood normal.         Behavior: Behavior normal.         Thought Content: Thought content normal.         Judgment: Judgment normal.          Patient ID: Yadira Kamara is a 24 y.o. female.    Insertion of Contraceptive Capsule    Date/Time: 5/30/2025 12:12 PM    Performed by: ALBERTINA Salmeron  Authorized by: ALBERTINA Salmeron    Consent:     Consent obtained:  Verbal and written    Consent given by:  Patient    Procedural risks discussed:  Bleeding, damage to other organs, death, failure rate, infection, repeat procedure, possible loss of function, possible conversion to open surgery and possible continued pain    Patient questions answered: yes      Patient agrees, verbalizes understanding, and wants to proceed: yes      Educational handouts given: yes      Instructions and paperwork completed: yes    Indication:     Indication: Insertion of non-biodegradable drug delivery implant    Pre-procedure:     Pre-procedure timeout performed: yes      Prepped with: alcohol 70% and povidone-iodine      Local anesthetic:  Lidocaine 1% and lidocaine without epinephrine    The site was cleaned and prepped in a sterile fashion: yes    Procedure:     Procedure:  Insertion    Small stab incision was made in arm: no      Left/right:  Left    Preloaded contraceptive capsule trocar was placed subdermally: yes      Palpation confirms placement by provider and patient: yes       Site was closed with steri-strips and pressure bandage applied: yes    Comments:      Patient tolerated well       Plan    Advised to call office for breast complaints, abnormal bleeding, mood changes, or other concerning symptoms.   Cleared to resume normal activity as desired  Reviewed home going instructions regarding  nexplanon insertion     Problem List Items Addressed This Visit    None  Visit Diagnoses         Codes      Encounter for postpartum visit    -  Primary Z39.2      Care and examination of lactating mother     Z39.1      Insertion of Nexplanon     Z30.017    Relevant Medications    etonogestrel-eluting contraceptive implant (Completed)    Other Relevant Orders    Insertion of Contraceptive Capsule            Julianna Mcnulty, YARY-GAVIM

## 2025-06-16 ENCOUNTER — PHARMACY VISIT (OUTPATIENT)
Dept: PHARMACY | Facility: CLINIC | Age: 25
End: 2025-06-16
Payer: MEDICAID

## 2025-06-16 PROCEDURE — RXMED WILLOW AMBULATORY MEDICATION CHARGE
